# Patient Record
Sex: MALE | Race: WHITE | Employment: FULL TIME | URBAN - METROPOLITAN AREA
[De-identification: names, ages, dates, MRNs, and addresses within clinical notes are randomized per-mention and may not be internally consistent; named-entity substitution may affect disease eponyms.]

---

## 2019-10-05 LAB — Lab: NORMAL

## 2024-10-13 ENCOUNTER — TELEMEDICINE (OUTPATIENT)
Dept: NEUROLOGY | Age: 44
End: 2024-10-13

## 2024-10-13 DIAGNOSIS — I72.0 CAROTID PSEUDOANEURYSM (HCC): Primary | ICD-10-CM

## 2024-10-16 ENCOUNTER — TELEPHONE (OUTPATIENT)
Dept: NEUROLOGY | Age: 44
End: 2024-10-16

## 2024-10-16 NOTE — TELEPHONE ENCOUNTER
Pt called in and wanting to schedule an appointment with you. You spoke with the ED doctor. Your next opening is not until 03/26/2025 and you stated to have pt follow up in 2 to 4 months. Would you like for me to double book you or have pt keep this appt in March? I put the patient on the wait list also.

## 2024-10-29 ENCOUNTER — APPOINTMENT (OUTPATIENT)
Dept: GENERAL RADIOLOGY | Age: 44
End: 2024-10-29
Payer: COMMERCIAL

## 2024-10-29 ENCOUNTER — APPOINTMENT (OUTPATIENT)
Dept: CT IMAGING | Age: 44
End: 2024-10-29
Payer: COMMERCIAL

## 2024-10-29 ENCOUNTER — APPOINTMENT (OUTPATIENT)
Dept: MRI IMAGING | Age: 44
End: 2024-10-29
Payer: COMMERCIAL

## 2024-10-29 ENCOUNTER — HOSPITAL ENCOUNTER (EMERGENCY)
Age: 44
Discharge: HOME | End: 2024-10-29
Attending: EMERGENCY MEDICINE
Payer: COMMERCIAL

## 2024-10-29 VITALS
OXYGEN SATURATION: 97 % | TEMPERATURE: 98.3 F | HEART RATE: 76 BPM | DIASTOLIC BLOOD PRESSURE: 106 MMHG | RESPIRATION RATE: 17 BRPM | SYSTOLIC BLOOD PRESSURE: 142 MMHG

## 2024-10-29 DIAGNOSIS — R42 DIZZINESS: Primary | ICD-10-CM

## 2024-10-29 LAB
ANION GAP SERPL CALCULATED.3IONS-SCNC: 10 MMOL/L (ref 9–16)
BASOPHILS # BLD: 0.04 K/UL (ref 0–0.2)
BASOPHILS NFR BLD: 1 % (ref 0–2)
BUN SERPL-MCNC: 8 MG/DL (ref 6–20)
CALCIUM SERPL-MCNC: 9.5 MG/DL (ref 8.6–10.4)
CHLORIDE SERPL-SCNC: 99 MMOL/L (ref 98–107)
CO2 SERPL-SCNC: 26 MMOL/L (ref 20–31)
CREAT SERPL-MCNC: 0.9 MG/DL (ref 0.7–1.2)
EOSINOPHIL # BLD: 0.17 K/UL (ref 0–0.44)
EOSINOPHILS RELATIVE PERCENT: 3 % (ref 1–4)
ERYTHROCYTE [DISTWIDTH] IN BLOOD BY AUTOMATED COUNT: 11.8 % (ref 11.8–14.4)
GFR, ESTIMATED: >90 ML/MIN/1.73M2
GLUCOSE SERPL-MCNC: 96 MG/DL (ref 74–99)
HCT VFR BLD AUTO: 47.6 % (ref 40.7–50.3)
HGB BLD-MCNC: 16.2 G/DL (ref 13–17)
IMM GRANULOCYTES # BLD AUTO: <0.03 K/UL (ref 0–0.3)
IMM GRANULOCYTES NFR BLD: 0 %
LYMPHOCYTES NFR BLD: 2 K/UL (ref 1.1–3.7)
LYMPHOCYTES RELATIVE PERCENT: 34 % (ref 24–43)
MAGNESIUM SERPL-MCNC: 2.4 MG/DL (ref 1.6–2.6)
MCH RBC QN AUTO: 31.7 PG (ref 25.2–33.5)
MCHC RBC AUTO-ENTMCNC: 34 G/DL (ref 28.4–34.8)
MCV RBC AUTO: 93.2 FL (ref 82.6–102.9)
MONOCYTES NFR BLD: 0.58 K/UL (ref 0.1–1.2)
MONOCYTES NFR BLD: 10 % (ref 3–12)
NEUTROPHILS NFR BLD: 52 % (ref 36–65)
NEUTS SEG NFR BLD: 3.04 K/UL (ref 1.5–8.1)
NRBC BLD-RTO: 0 PER 100 WBC
PLATELET # BLD AUTO: 272 K/UL (ref 138–453)
PMV BLD AUTO: 10 FL (ref 8.1–13.5)
POTASSIUM SERPL-SCNC: 3.8 MMOL/L (ref 3.7–5.3)
RBC # BLD AUTO: 5.11 M/UL (ref 4.21–5.77)
SODIUM SERPL-SCNC: 135 MMOL/L (ref 136–145)
TROPONIN I SERPL HS-MCNC: <6 NG/L (ref 0–22)
WBC OTHER # BLD: 5.9 K/UL (ref 3.5–11.3)

## 2024-10-29 PROCEDURE — 71046 X-RAY EXAM CHEST 2 VIEWS: CPT

## 2024-10-29 PROCEDURE — 80048 BASIC METABOLIC PNL TOTAL CA: CPT

## 2024-10-29 PROCEDURE — 85025 COMPLETE CBC W/AUTO DIFF WBC: CPT

## 2024-10-29 PROCEDURE — 83735 ASSAY OF MAGNESIUM: CPT

## 2024-10-29 PROCEDURE — 93005 ELECTROCARDIOGRAM TRACING: CPT | Performed by: STUDENT IN AN ORGANIZED HEALTH CARE EDUCATION/TRAINING PROGRAM

## 2024-10-29 PROCEDURE — 70551 MRI BRAIN STEM W/O DYE: CPT

## 2024-10-29 PROCEDURE — 70498 CT ANGIOGRAPHY NECK: CPT

## 2024-10-29 PROCEDURE — 70450 CT HEAD/BRAIN W/O DYE: CPT

## 2024-10-29 PROCEDURE — 84484 ASSAY OF TROPONIN QUANT: CPT

## 2024-10-29 PROCEDURE — 6360000004 HC RX CONTRAST MEDICATION: Performed by: STUDENT IN AN ORGANIZED HEALTH CARE EDUCATION/TRAINING PROGRAM

## 2024-10-29 PROCEDURE — 99285 EMERGENCY DEPT VISIT HI MDM: CPT | Performed by: PSYCHIATRY & NEUROLOGY

## 2024-10-29 PROCEDURE — 99285 EMERGENCY DEPT VISIT HI MDM: CPT

## 2024-10-29 RX ORDER — IOPAMIDOL 755 MG/ML
75 INJECTION, SOLUTION INTRAVASCULAR
Status: COMPLETED | OUTPATIENT
Start: 2024-10-29 | End: 2024-10-29

## 2024-10-29 RX ADMIN — IOPAMIDOL 75 ML: 755 INJECTION, SOLUTION INTRAVENOUS at 15:50

## 2024-10-29 ASSESSMENT — ENCOUNTER SYMPTOMS: RESPIRATORY NEGATIVE: 1

## 2024-10-29 ASSESSMENT — PAIN - FUNCTIONAL ASSESSMENT: PAIN_FUNCTIONAL_ASSESSMENT: NONE - DENIES PAIN

## 2024-10-29 NOTE — ED NOTES
Pt came into the ed via triage due to having dizziness,   Pt has a hx of vertigo   Pt stated he has pain in the left arm   Pt is Aox4 and ambulatory   RR are equal and regular   Pt recently was at the hospital   Family at bedside   Pt stated his dizziness has been getting worse

## 2024-10-29 NOTE — CONSULTS
Endovascular Neurosurgery Consult    Pt Name: Jeremy Teran  MRN: [id number]  YOB: 1980  Date of evaluation: 10/29/2024  Primary Care Physician: Burak Malloy MD  Reason for evaluation: L ICA pseudoaneurysm     SUBJECTIVE:   History of Chief Complaint:    Jeremy Teran is a 44 y.o. 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  Patient states that this dizziness has been for 3 weeks and was evaluated at [facility] ER where CTA head and neck done did not show short segment fusiform dilatation of the right vertebral artery V4 segment measuring up to 4.5 mm as well as short segment irregularity at the mid to distal left cervical ICA possible short segment dissection.  At the time neuroendovascular has been consulted through telemedicine and recommended outpatient neurology consultation with daily aspirin.  Patient states that the dizziness gets better with meclizine but today it has been ongoing for 2 hours with intermittent relief.  There is no loss of consciousness, he describes the dizziness as a spinning sensation associated with blurring of vision during the attack but no diplopia, visual loss.  He denies tinnitus, hearing loss, upper respiratory tract infection symptoms, headaches, head injury, limb weakness but mentions that pain in his left arm along with tingling sensation of fourth and fifth fingers of left hand.  Patient states that he was told at [facility] that whenever his symptoms get worse to go to [facility] which brought him here today.     Neuroendovascular was consulted regarding incidental finding of left distal cervical ica pseudoaneurysm and underlying dissection     Allergies  is allergic to codeine and flagyl [metronidazole].  Medications  Prior to Admission medications    Not on File    Scheduled Meds:  Continuous Infusions:  PRN Meds:.  Past 
discussed with the patient, patient's family and the medical staff.     Patient is admitted as inpatient status because of co-morbidities listed above, severity of signs and symptoms as outlined, requirement for current medical therapies and most importantly because of direct risk to patient if care not provided in a hospital setting.    Narayan Echols MD  Neurology Resident PGY-2  10/29/2024  4:21 PM    Copy sent to Burak Lima MD

## 2024-10-30 LAB
EKG ATRIAL RATE: 77 BPM
EKG P AXIS: 49 DEGREES
EKG P-R INTERVAL: 138 MS
EKG Q-T INTERVAL: 364 MS
EKG QRS DURATION: 90 MS
EKG QTC CALCULATION (BAZETT): 411 MS
EKG R AXIS: 44 DEGREES
EKG T AXIS: 2 DEGREES
EKG VENTRICULAR RATE: 77 BPM

## 2024-10-30 PROCEDURE — 93010 ELECTROCARDIOGRAM REPORT: CPT | Performed by: INTERNAL MEDICINE

## 2024-10-30 NOTE — DISCHARGE INSTRUCTIONS
You were seen due to dizziness.  Our neuroendovascular team did evaluate you and they recommend that she follow-up with them as an outpatient.  Please continue your daily aspirin and take meclizine as needed for dizziness.  Please see the speech therapist above for vestibular rehab.    Please return to the ED if you notice any sudden onset headache, nausea, vomiting, fever, chills, confusion, loss of sensation, sudden neck pain or for any other concern.

## 2024-10-30 NOTE — ED PROVIDER NOTES
[facility] ED     Emergency Department     Faculty Attestation        I performed a history and physical examination of the patient and discussed management with the resident. I reviewed the resident’s note and agree with the documented findings and plan of care. Any areas of disagreement are noted on the chart. I was personally present for the key portions of any procedures. I have documented in the chart those procedures where I was not present during the key portions. I have reviewed the emergency nurses triage note. I agree with the chief complaint, past medical history, past surgical history, allergies, medications, social and family history as documented unless otherwise noted below.    For mid-level providers such as nurse practitioners as well as physicians assistants:    I have personally seen and evaluated the patient.    I find the patient's history and physical exam are consistent with NP/PA documentation.  I agree with the care provided, treatment rendered, disposition, & follow-up plan.     Additional findings are as noted.    Vital Signs: BP (!) 141/97   Pulse 89   Temp 98.3 °F (36.8 °C) (Oral)   Resp 19   SpO2 100%   PCP:  Burak Malloy MD    Pertinent Comments:     Patient with vertigo.  He has had the symptoms persistently for 2 weeks.  He had CTA at outlying facility which showed a small aneurysm with small dissection flap and he was put on aspirin Plavix he has continued to be symptomatic he now has new numbness to his left upper extremities and present for the past 3 to 4 days will repeat CT head neck, stroke consultation        Roly Oleary MD    Attending Emergency Medicine Physician            Julio Oleary MD  10/29/24 [id number]    
     [facility] ED  Emergency Department  Emergency Medicine Resident Sign-out     Care of Jeremy Teran was assumed from Dr. Yusuf and is being seen for Dizziness  .  The patient's initial evaluation and plan have been discussed with the prior provider who initially evaluated the patient.     EMERGENCY DEPARTMENT COURSE / MEDICAL DECISION MAKING:       MEDICATIONS GIVEN:  No orders of the defined types were placed in this encounter.      LABS / RADIOLOGY:     Labs Reviewed   BASIC METABOLIC PANEL - Abnormal; Notable for the following components:       Result Value    Sodium 135 (*)     All other components within normal limits   CBC WITH AUTO DIFFERENTIAL   MAGNESIUM   PREVIOUS SPECIMEN   TROPONIN   POC BLOOD GAS AND CHEMISTRY       XR CHEST (2 VW)    Result Date: 10/29/2024  EXAMINATION: TWO XRAY VIEWS OF THE CHEST 10/29/2024 2:02 pm COMPARISON: None. HISTORY: ORDERING SYSTEM PROVIDED HISTORY: dizziness TECHNOLOGIST PROVIDED HISTORY: dizziness FINDINGS: The lungs are without acute focal process.  There is no effusion or pneumothorax. The cardiomediastinal silhouette is without acute process. The osseous structures are without acute process.     No acute process.       RECENT VITALS:     Temp: 98.3 °F (36.8 °C),  Pulse: 89, Respirations: 16, BP: 130/89, SpO2: 96 %      This patient is a 44 y.o. Male with previously healthy male. Dizziness spells with vision changes, spontaneous. Went to [facility] and obtained CT head, has a L IC pseudoanerysm, right vertebral artery abnormality. Started on daily ASA and outpatient follow up. Has spells for hours, Left arm paresthesias. Took meclizine which helped. Cardiac eval with some T wave inversions but no STEMI. Trop less than 6. Labs unremarkable. CXR negative. CTA head and neck and CT head without. Neuro and Neuroendovascular consulted. Aniscicoria to right pupil.      ED Course as of 10/29/24 2347   Tue Oct 29, 2024   1730 CTA HEAD NECK W 
Faculty Sign-Out Attestation  Handoff taken on the following patient from prior Attending Physician: hue  Note Started: 9:14 PM EDT    I was available and discussed any additional care issues that arose and coordinated the management plans with the resident(s) caring for the patient during my duty period. Any areas of disagreement with resident’s documentation of care or procedures are noted on the chart. I was personally present for the key portions of any/all procedures during my duty period. I have documented in the chart those procedures where I was not present during the key portions.    Patient was signed out by the previous physician pending neurology and neuroendovascular evaluation, MRI, CTA of the head and neck.  MRI was negative.  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  The patient's symptoms are improved.  Neurology and neuroendovascular recommend outpatient follow-up with continued oral aspirin.  The patient was instructed to return back to emergency department immediately for any change in neurologic symptoms or sudden onset headache.      Abelardo Mcdonnell DO  Attending Physician       Abelardo Mcdonnell DO  10/29/24 8271    
PM  Labs reviewed.  Troponin less than 6.  CBC BMP magnesium unremarkable    CTA and CT head pending.    Neurology and neuroendovascular have been paged pending their assessment.    This patient's care will be handed over to an oncoming resident.           PROCEDURES:       CONSULTS:  IP CONSULT TO ENDOVASCULAR NEUROSURGERY  IP CONSULT TO NEUROLOGY    CRITICAL CARE:  There was significant risk of life threatening deterioration of patient's condition requiring my direct management. Critical care time   minutes, excluding any documented procedures.    FINAL IMPRESSION      1. Dizziness          DISPOSITION / PLAN     DISPOSITION             PATIENT REFERRED TO:  No follow-up provider specified.    DISCHARGE MEDICATIONS:  New Prescriptions    No medications on file       Vinicio Yusuf MD  Emergency Medicine Resident    (Please note that portions of this note were completed with a voice recognition program.  Efforts were made to edit the dictations but occasionally words are mis-transcribed.)

## 2024-10-31 RX ORDER — ASPIRIN 81 MG/1
81 TABLET ORAL DAILY
Qty: 90 TABLET | Refills: 5 | Status: SHIPPED | OUTPATIENT
Start: 2024-10-31

## 2024-10-31 RX ORDER — CLOPIDOGREL BISULFATE 75 MG/1
75 TABLET ORAL DAILY
Qty: 30 TABLET | Refills: 5 | Status: SHIPPED | OUTPATIENT
Start: 2024-10-31

## 2024-11-01 RX ORDER — FLUTICASONE PROPIONATE 50 MCG
2 SPRAY, SUSPENSION (ML) NASAL DAILY
COMMUNITY
Start: 2024-10-23

## 2024-11-01 RX ORDER — MECLIZINE HYDROCHLORIDE 25 MG/1
25 TABLET ORAL 3 TIMES DAILY PRN
COMMUNITY
Start: 2024-10-09

## 2024-11-04 ENCOUNTER — ANESTHESIA EVENT (OUTPATIENT)
Dept: INTERVENTIONAL RADIOLOGY/VASCULAR | Age: 44
DRG: 039 | End: 2024-11-04
Payer: COMMERCIAL

## 2024-11-04 ENCOUNTER — ANESTHESIA (OUTPATIENT)
Dept: INTERVENTIONAL RADIOLOGY/VASCULAR | Age: 44
DRG: 039 | End: 2024-11-04
Payer: COMMERCIAL

## 2024-11-04 ENCOUNTER — HOSPITAL ENCOUNTER (INPATIENT)
Dept: INTERVENTIONAL RADIOLOGY/VASCULAR | Age: 44
LOS: 1 days | Discharge: HOME | DRG: 039 | End: 2024-11-05
Attending: STUDENT IN AN ORGANIZED HEALTH CARE EDUCATION/TRAINING PROGRAM | Admitting: STUDENT IN AN ORGANIZED HEALTH CARE EDUCATION/TRAINING PROGRAM
Payer: COMMERCIAL

## 2024-11-04 DIAGNOSIS — I72.0 CAROTID PSEUDOANEURYSM (HCC): ICD-10-CM

## 2024-11-04 LAB
ABO + RH BLD: NORMAL
ACT BLD: 123 SEC (ref 79–149)
ACT BLD: 228 SEC (ref 79–149)
ACT BLD: 262 SEC (ref 79–149)
ARM BAND NUMBER: NORMAL
BLOOD BANK SAMPLE EXPIRATION: NORMAL
BLOOD GROUP ANTIBODIES SERPL: NEGATIVE
BUN BLD-MCNC: 9 MG/DL (ref 8–26)
CA-I BLD-SCNC: 1.26 MMOL/L (ref 1.15–1.33)
CHLORIDE BLD-SCNC: 103 MMOL/L (ref 98–107)
CLOSURE TME COLL+ADP BLD: 119 SEC (ref 67–112)
CO2 BLD CALC-SCNC: 32 MMOL/L (ref 22–30)
COLLAGEN EPINEPHRINE TIME: >300 SEC (ref 85–172)
EGFR, POC: >90 ML/MIN/1.73M2
GLUCOSE BLD-MCNC: 100 MG/DL (ref 74–100)
HCO3 VENOUS: 31 MMOL/L (ref 22–29)
HCT VFR BLD AUTO: 50 % (ref 41–53)
O2 SAT, VEN: 59.9 % (ref 60–85)
PCO2 VENOUS: 57.1 MM HG (ref 41–51)
PH VENOUS: 7.34 (ref 7.32–7.43)
PLATELET FUNCTION INTERP: ABNORMAL
PO2 VENOUS: 33.8 MM HG (ref 30–50)
POC ANION GAP: 6 MMOL/L (ref 7–16)
POC CREATININE: 0.9 MG/DL (ref 0.51–1.19)
POC HEMOGLOBIN (CALC): 17 G/DL (ref 13.5–17.5)
POC LACTIC ACID: 1.1 MMOL/L (ref 0.56–1.39)
POSITIVE BASE EXCESS, VEN: 3.3 MMOL/L (ref 0–3)
POTASSIUM BLD-SCNC: 4.1 MMOL/L (ref 3.5–4.5)
POTASSIUM BLD-SCNC: 6.4 MMOL/L (ref 3.5–5.1)
SODIUM BLD-SCNC: 140 MMOL/L (ref 138–146)

## 2024-11-04 PROCEDURE — 84132 ASSAY OF SERUM POTASSIUM: CPT

## 2024-11-04 PROCEDURE — 2580000003 HC RX 258: Performed by: ANESTHESIOLOGY

## 2024-11-04 PROCEDURE — 3700000000 HC ANESTHESIA ATTENDED CARE

## 2024-11-04 PROCEDURE — 6360000002 HC RX W HCPCS: Performed by: PSYCHIATRY & NEUROLOGY

## 2024-11-04 PROCEDURE — 82803 BLOOD GASES ANY COMBINATION: CPT

## 2024-11-04 PROCEDURE — 6360000002 HC RX W HCPCS

## 2024-11-04 PROCEDURE — 61626 TCAT PERM OCCLS/EMBOL NONCNS: CPT

## 2024-11-04 PROCEDURE — 36224 PLACE CATH CAROTD ART: CPT

## 2024-11-04 PROCEDURE — 85014 HEMATOCRIT: CPT

## 2024-11-04 PROCEDURE — 75894 X-RAYS TRANSCATH THERAPY: CPT

## 2024-11-04 PROCEDURE — 94761 N-INVAS EAR/PLS OXIMETRY MLT: CPT

## 2024-11-04 PROCEDURE — 75898 FOLLOW-UP ANGIOGRAPHY: CPT

## 2024-11-04 PROCEDURE — 86850 RBC ANTIBODY SCREEN: CPT

## 2024-11-04 PROCEDURE — 83605 ASSAY OF LACTIC ACID: CPT

## 2024-11-04 PROCEDURE — 80051 ELECTROLYTE PANEL: CPT

## 2024-11-04 PROCEDURE — 6360000004 HC RX CONTRAST MEDICATION: Performed by: STUDENT IN AN ORGANIZED HEALTH CARE EDUCATION/TRAINING PROGRAM

## 2024-11-04 PROCEDURE — 6370000000 HC RX 637 (ALT 250 FOR IP): Performed by: PSYCHIATRY & NEUROLOGY

## 2024-11-04 PROCEDURE — 84520 ASSAY OF UREA NITROGEN: CPT

## 2024-11-04 PROCEDURE — 2580000003 HC RX 258: Performed by: PSYCHIATRY & NEUROLOGY

## 2024-11-04 PROCEDURE — 86901 BLOOD TYPING SEROLOGIC RH(D): CPT

## 2024-11-04 PROCEDURE — 2580000003 HC RX 258: Performed by: NURSE PRACTITIONER

## 2024-11-04 PROCEDURE — C1876 STENT, NON-COA/NON-COV W/DEL: HCPCS

## 2024-11-04 PROCEDURE — 03VL3HZ RESTRICTION OF LEFT INTERNAL CAROTID ARTERY WITH INTRALUMINAL DEVICE, FLOW DIVERTER, PERCUTANEOUS APPROACH: ICD-10-PCS | Performed by: PSYCHIATRY & NEUROLOGY

## 2024-11-04 PROCEDURE — 3700000001 HC ADD 15 MINUTES (ANESTHESIA)

## 2024-11-04 PROCEDURE — 2000000000 HC ICU R&B

## 2024-11-04 PROCEDURE — 85347 COAGULATION TIME ACTIVATED: CPT

## 2024-11-04 PROCEDURE — 82947 ASSAY GLUCOSE BLOOD QUANT: CPT

## 2024-11-04 PROCEDURE — 85576 BLOOD PLATELET AGGREGATION: CPT

## 2024-11-04 PROCEDURE — 82565 ASSAY OF CREATININE: CPT

## 2024-11-04 PROCEDURE — 86900 BLOOD TYPING SEROLOGIC ABO: CPT

## 2024-11-04 PROCEDURE — 82330 ASSAY OF CALCIUM: CPT

## 2024-11-04 PROCEDURE — 6360000002 HC RX W HCPCS: Performed by: NURSE PRACTITIONER

## 2024-11-04 PROCEDURE — C1894 INTRO/SHEATH, NON-LASER: HCPCS

## 2024-11-04 RX ORDER — ONDANSETRON 4 MG/1
4 TABLET, ORALLY DISINTEGRATING ORAL EVERY 8 HOURS PRN
Status: DISCONTINUED | OUTPATIENT
Start: 2024-11-04 | End: 2024-11-05 | Stop reason: HOSPADM

## 2024-11-04 RX ORDER — SODIUM CHLORIDE, SODIUM LACTATE, POTASSIUM CHLORIDE, CALCIUM CHLORIDE 600; 310; 30; 20 MG/100ML; MG/100ML; MG/100ML; MG/100ML
INJECTION, SOLUTION INTRAVENOUS CONTINUOUS
Status: DISCONTINUED | OUTPATIENT
Start: 2024-11-04 | End: 2024-11-04

## 2024-11-04 RX ORDER — PROCHLORPERAZINE EDISYLATE 5 MG/ML
10 INJECTION INTRAMUSCULAR; INTRAVENOUS ONCE
Status: COMPLETED | OUTPATIENT
Start: 2024-11-04 | End: 2024-11-04

## 2024-11-04 RX ORDER — LABETALOL HYDROCHLORIDE 5 MG/ML
10 INJECTION, SOLUTION INTRAVENOUS
Status: DISCONTINUED | OUTPATIENT
Start: 2024-11-04 | End: 2024-11-05 | Stop reason: HOSPADM

## 2024-11-04 RX ORDER — ONDANSETRON 2 MG/ML
INJECTION INTRAMUSCULAR; INTRAVENOUS
Status: DISCONTINUED | OUTPATIENT
Start: 2024-11-04 | End: 2024-11-04 | Stop reason: SDUPTHER

## 2024-11-04 RX ORDER — DIPHENHYDRAMINE HYDROCHLORIDE 50 MG/ML
25 INJECTION INTRAMUSCULAR; INTRAVENOUS ONCE
Status: COMPLETED | OUTPATIENT
Start: 2024-11-04 | End: 2024-11-04

## 2024-11-04 RX ORDER — MAGNESIUM SULFATE IN WATER 40 MG/ML
2000 INJECTION, SOLUTION INTRAVENOUS ONCE
Status: COMPLETED | OUTPATIENT
Start: 2024-11-04 | End: 2024-11-04

## 2024-11-04 RX ORDER — KETOROLAC TROMETHAMINE 15 MG/ML
15 INJECTION, SOLUTION INTRAMUSCULAR; INTRAVENOUS ONCE
Status: COMPLETED | OUTPATIENT
Start: 2024-11-04 | End: 2024-11-04

## 2024-11-04 RX ORDER — MAGNESIUM SULFATE IN WATER 40 MG/ML
2000 INJECTION, SOLUTION INTRAVENOUS PRN
Status: DISCONTINUED | OUTPATIENT
Start: 2024-11-04 | End: 2024-11-05 | Stop reason: HOSPADM

## 2024-11-04 RX ORDER — DIPHENHYDRAMINE HYDROCHLORIDE 50 MG/ML
12.5 INJECTION INTRAMUSCULAR; INTRAVENOUS
Status: DISCONTINUED | OUTPATIENT
Start: 2024-11-04 | End: 2024-11-04

## 2024-11-04 RX ORDER — CLOPIDOGREL BISULFATE 75 MG/1
75 TABLET ORAL DAILY
Status: DISCONTINUED | OUTPATIENT
Start: 2024-11-05 | End: 2024-11-05 | Stop reason: HOSPADM

## 2024-11-04 RX ORDER — SODIUM CHLORIDE 0.9 % (FLUSH) 0.9 %
5-40 SYRINGE (ML) INJECTION PRN
Status: DISCONTINUED | OUTPATIENT
Start: 2024-11-04 | End: 2024-11-05 | Stop reason: HOSPADM

## 2024-11-04 RX ORDER — HEPARIN SODIUM 1000 [USP'U]/ML
INJECTION, SOLUTION INTRAVENOUS; SUBCUTANEOUS
Status: DISCONTINUED | OUTPATIENT
Start: 2024-11-04 | End: 2024-11-04 | Stop reason: SDUPTHER

## 2024-11-04 RX ORDER — SODIUM CHLORIDE 0.9 % (FLUSH) 0.9 %
5-40 SYRINGE (ML) INJECTION EVERY 12 HOURS SCHEDULED
Status: DISCONTINUED | OUTPATIENT
Start: 2024-11-04 | End: 2024-11-05 | Stop reason: HOSPADM

## 2024-11-04 RX ORDER — POLYETHYLENE GLYCOL 3350 17 G/17G
17 POWDER, FOR SOLUTION ORAL DAILY PRN
Status: DISCONTINUED | OUTPATIENT
Start: 2024-11-04 | End: 2024-11-05 | Stop reason: HOSPADM

## 2024-11-04 RX ORDER — NALOXONE HYDROCHLORIDE 0.4 MG/ML
INJECTION, SOLUTION INTRAMUSCULAR; INTRAVENOUS; SUBCUTANEOUS PRN
Status: DISCONTINUED | OUTPATIENT
Start: 2024-11-04 | End: 2024-11-04

## 2024-11-04 RX ORDER — DROPERIDOL 2.5 MG/ML
0.62 INJECTION, SOLUTION INTRAMUSCULAR; INTRAVENOUS
Status: DISCONTINUED | OUTPATIENT
Start: 2024-11-04 | End: 2024-11-04

## 2024-11-04 RX ORDER — OXYCODONE HYDROCHLORIDE 5 MG/1
10 TABLET ORAL PRN
Status: DISCONTINUED | OUTPATIENT
Start: 2024-11-04 | End: 2024-11-04

## 2024-11-04 RX ORDER — LORAZEPAM 2 MG/ML
0.5 INJECTION INTRAMUSCULAR
Status: DISCONTINUED | OUTPATIENT
Start: 2024-11-04 | End: 2024-11-04

## 2024-11-04 RX ORDER — PANTOPRAZOLE SODIUM 40 MG/1
40 TABLET, DELAYED RELEASE ORAL
Status: DISCONTINUED | OUTPATIENT
Start: 2024-11-05 | End: 2024-11-05 | Stop reason: HOSPADM

## 2024-11-04 RX ORDER — IODIXANOL 270 MG/ML
100 INJECTION, SOLUTION INTRAVASCULAR
Status: COMPLETED | OUTPATIENT
Start: 2024-11-04 | End: 2024-11-04

## 2024-11-04 RX ORDER — POTASSIUM CHLORIDE 1500 MG/1
40 TABLET, EXTENDED RELEASE ORAL PRN
Status: DISCONTINUED | OUTPATIENT
Start: 2024-11-04 | End: 2024-11-05 | Stop reason: HOSPADM

## 2024-11-04 RX ORDER — MIDAZOLAM HYDROCHLORIDE 1 MG/ML
INJECTION, SOLUTION INTRAMUSCULAR; INTRAVENOUS
Status: DISCONTINUED | OUTPATIENT
Start: 2024-11-04 | End: 2024-11-04 | Stop reason: SDUPTHER

## 2024-11-04 RX ORDER — FLUTICASONE PROPIONATE 50 MCG
2 SPRAY, SUSPENSION (ML) NASAL DAILY PRN
Status: DISCONTINUED | OUTPATIENT
Start: 2024-11-05 | End: 2024-11-05 | Stop reason: HOSPADM

## 2024-11-04 RX ORDER — POTASSIUM CHLORIDE 7.45 MG/ML
10 INJECTION INTRAVENOUS PRN
Status: DISCONTINUED | OUTPATIENT
Start: 2024-11-04 | End: 2024-11-05 | Stop reason: HOSPADM

## 2024-11-04 RX ORDER — MECLIZINE HCL 12.5 MG 12.5 MG/1
25 TABLET ORAL 3 TIMES DAILY PRN
Status: DISCONTINUED | OUTPATIENT
Start: 2024-11-04 | End: 2024-11-05 | Stop reason: HOSPADM

## 2024-11-04 RX ORDER — HYDRALAZINE HYDROCHLORIDE 20 MG/ML
10 INJECTION INTRAMUSCULAR; INTRAVENOUS
Status: DISCONTINUED | OUTPATIENT
Start: 2024-11-04 | End: 2024-11-04

## 2024-11-04 RX ORDER — HYDRALAZINE HYDROCHLORIDE 20 MG/ML
10 INJECTION INTRAMUSCULAR; INTRAVENOUS
Status: DISCONTINUED | OUTPATIENT
Start: 2024-11-04 | End: 2024-11-05 | Stop reason: HOSPADM

## 2024-11-04 RX ORDER — FENTANYL CITRATE 50 UG/ML
25 INJECTION, SOLUTION INTRAMUSCULAR; INTRAVENOUS EVERY 5 MIN PRN
Status: DISCONTINUED | OUTPATIENT
Start: 2024-11-04 | End: 2024-11-04

## 2024-11-04 RX ORDER — 0.9 % SODIUM CHLORIDE 0.9 %
250 INTRAVENOUS SOLUTION INTRAVENOUS ONCE
Status: COMPLETED | OUTPATIENT
Start: 2024-11-04 | End: 2024-11-04

## 2024-11-04 RX ORDER — ONDANSETRON 2 MG/ML
4 INJECTION INTRAMUSCULAR; INTRAVENOUS EVERY 6 HOURS PRN
Status: DISCONTINUED | OUTPATIENT
Start: 2024-11-04 | End: 2024-11-05 | Stop reason: HOSPADM

## 2024-11-04 RX ORDER — LABETALOL HYDROCHLORIDE 5 MG/ML
10 INJECTION, SOLUTION INTRAVENOUS
Status: DISCONTINUED | OUTPATIENT
Start: 2024-11-04 | End: 2024-11-04

## 2024-11-04 RX ORDER — SODIUM CHLORIDE 9 MG/ML
INJECTION, SOLUTION INTRAVENOUS PRN
Status: DISCONTINUED | OUTPATIENT
Start: 2024-11-04 | End: 2024-11-05 | Stop reason: HOSPADM

## 2024-11-04 RX ORDER — ASPIRIN 81 MG/1
81 TABLET ORAL DAILY
Status: DISCONTINUED | OUTPATIENT
Start: 2024-11-05 | End: 2024-11-05 | Stop reason: HOSPADM

## 2024-11-04 RX ORDER — PROCHLORPERAZINE EDISYLATE 5 MG/ML
5 INJECTION INTRAMUSCULAR; INTRAVENOUS
Status: DISCONTINUED | OUTPATIENT
Start: 2024-11-04 | End: 2024-11-04

## 2024-11-04 RX ORDER — ACETAMINOPHEN 325 MG/1
650 TABLET ORAL EVERY 6 HOURS PRN
Status: DISCONTINUED | OUTPATIENT
Start: 2024-11-04 | End: 2024-11-05 | Stop reason: HOSPADM

## 2024-11-04 RX ORDER — FENTANYL CITRATE 50 UG/ML
INJECTION, SOLUTION INTRAMUSCULAR; INTRAVENOUS
Status: DISCONTINUED | OUTPATIENT
Start: 2024-11-04 | End: 2024-11-04 | Stop reason: SDUPTHER

## 2024-11-04 RX ORDER — ACETAMINOPHEN 650 MG/1
650 SUPPOSITORY RECTAL EVERY 6 HOURS PRN
Status: DISCONTINUED | OUTPATIENT
Start: 2024-11-04 | End: 2024-11-05 | Stop reason: HOSPADM

## 2024-11-04 RX ORDER — PROTAMINE SULFATE 10 MG/ML
INJECTION, SOLUTION INTRAVENOUS
Status: DISCONTINUED | OUTPATIENT
Start: 2024-11-04 | End: 2024-11-04 | Stop reason: SDUPTHER

## 2024-11-04 RX ORDER — OXYCODONE HYDROCHLORIDE 5 MG/1
5 TABLET ORAL PRN
Status: DISCONTINUED | OUTPATIENT
Start: 2024-11-04 | End: 2024-11-04

## 2024-11-04 RX ORDER — NICARDIPINE HYDROCHLORIDE 0.1 MG/ML
2.5-15 INJECTION INTRAVENOUS CONTINUOUS
Status: DISCONTINUED | OUTPATIENT
Start: 2024-11-04 | End: 2024-11-05

## 2024-11-04 RX ADMIN — ACETAMINOPHEN 650 MG: 325 TABLET ORAL at 12:28

## 2024-11-04 RX ADMIN — ONDANSETRON 4 MG: 2 INJECTION INTRAMUSCULAR; INTRAVENOUS at 15:38

## 2024-11-04 RX ADMIN — PROTAMINE SULFATE 40 MG: 10 INJECTION, SOLUTION INTRAVENOUS at 10:20

## 2024-11-04 RX ADMIN — PROCHLORPERAZINE EDISYLATE 10 MG: 5 INJECTION INTRAMUSCULAR; INTRAVENOUS at 18:05

## 2024-11-04 RX ADMIN — NICARDIPINE HYDROCHLORIDE 5 MG/HR: 0.1 INJECTION INTRAVENOUS at 15:47

## 2024-11-04 RX ADMIN — SODIUM CHLORIDE, PRESERVATIVE FREE 10 ML: 5 INJECTION INTRAVENOUS at 11:21

## 2024-11-04 RX ADMIN — HEPARIN SODIUM 5200 UNITS: 1000 INJECTION INTRAVENOUS; SUBCUTANEOUS at 09:04

## 2024-11-04 RX ADMIN — FENTANYL CITRATE 25 MCG: 50 INJECTION, SOLUTION INTRAMUSCULAR; INTRAVENOUS at 10:18

## 2024-11-04 RX ADMIN — HYDRALAZINE HYDROCHLORIDE 10 MG: 20 INJECTION INTRAMUSCULAR; INTRAVENOUS at 14:57

## 2024-11-04 RX ADMIN — LABETALOL HYDROCHLORIDE 10 MG: 5 INJECTION, SOLUTION INTRAVENOUS at 13:30

## 2024-11-04 RX ADMIN — Medication 2 G: at 08:45

## 2024-11-04 RX ADMIN — FENTANYL CITRATE 50 MCG: 50 INJECTION, SOLUTION INTRAMUSCULAR; INTRAVENOUS at 10:23

## 2024-11-04 RX ADMIN — FENTANYL CITRATE 50 MCG: 50 INJECTION, SOLUTION INTRAMUSCULAR; INTRAVENOUS at 08:54

## 2024-11-04 RX ADMIN — KETOROLAC TROMETHAMINE 15 MG: 15 INJECTION, SOLUTION INTRAMUSCULAR; INTRAVENOUS at 18:06

## 2024-11-04 RX ADMIN — FENTANYL CITRATE 50 MCG: 50 INJECTION, SOLUTION INTRAMUSCULAR; INTRAVENOUS at 08:19

## 2024-11-04 RX ADMIN — ONDANSETRON 4 MG: 2 INJECTION INTRAMUSCULAR; INTRAVENOUS at 10:39

## 2024-11-04 RX ADMIN — DIPHENHYDRAMINE HYDROCHLORIDE 25 MG: 50 INJECTION INTRAMUSCULAR; INTRAVENOUS at 18:06

## 2024-11-04 RX ADMIN — MAGNESIUM SULFATE HEPTAHYDRATE 2000 MG: 40 INJECTION, SOLUTION INTRAVENOUS at 16:24

## 2024-11-04 RX ADMIN — FENTANYL CITRATE 50 MCG: 50 INJECTION, SOLUTION INTRAMUSCULAR; INTRAVENOUS at 10:37

## 2024-11-04 RX ADMIN — MIDAZOLAM 2 MG: 1 INJECTION INTRAMUSCULAR; INTRAVENOUS at 08:16

## 2024-11-04 RX ADMIN — IODIXANOL 82 ML: 270 INJECTION, SOLUTION INTRAVASCULAR at 10:35

## 2024-11-04 RX ADMIN — NICARDIPINE HYDROCHLORIDE 5 MG/HR: 0.1 INJECTION INTRAVENOUS at 18:30

## 2024-11-04 RX ADMIN — FENTANYL CITRATE 50 MCG: 50 INJECTION, SOLUTION INTRAMUSCULAR; INTRAVENOUS at 09:25

## 2024-11-04 RX ADMIN — HEPARIN SODIUM 2000 UNITS: 1000 INJECTION INTRAVENOUS; SUBCUTANEOUS at 09:31

## 2024-11-04 RX ADMIN — FENTANYL CITRATE 25 MCG: 50 INJECTION, SOLUTION INTRAMUSCULAR; INTRAVENOUS at 10:00

## 2024-11-04 RX ADMIN — SODIUM CHLORIDE 250 ML: 9 INJECTION, SOLUTION INTRAVENOUS at 15:44

## 2024-11-04 RX ADMIN — SODIUM CHLORIDE, POTASSIUM CHLORIDE, SODIUM LACTATE AND CALCIUM CHLORIDE: 600; 310; 30; 20 INJECTION, SOLUTION INTRAVENOUS at 06:53

## 2024-11-04 ASSESSMENT — PAIN SCALES - GENERAL
PAINLEVEL_OUTOF10: 4
PAINLEVEL_OUTOF10: 0
PAINLEVEL_OUTOF10: 2
PAINLEVEL_OUTOF10: 0

## 2024-11-04 ASSESSMENT — PAIN DESCRIPTION - LOCATION: LOCATION: HEAD

## 2024-11-04 ASSESSMENT — PAIN - FUNCTIONAL ASSESSMENT: PAIN_FUNCTIONAL_ASSESSMENT: NONE - DENIES PAIN

## 2024-11-04 NOTE — ANESTHESIA PROCEDURE NOTES
Arterial Line:    An arterial line was placed using surface landmarks, in the OR for the following indication(s): .    A 20 gauge (size), 1 and 3/4 inch (length), Arrow (type) catheter was placed, Seldinger technique used, into the right radial artery, secured by Tegaderm.  Anesthesia type: Local  Local infiltration: Injection    Events:  patient tolerated procedure well with no complications and EBL < 5mL.11/4/2024 8:20 AM11/4/2024 8:34 AM  Anesthesiologist: Filippo Sanchez MD  Resident/CRNA: Swapnil Esteves APRN - CRNA  Other anesthesia staff: Tashia Khan RN  Performed: Resident/CRNA   Preanesthetic Checklist  Completed: patient identified, IV checked, site marked, risks and benefits discussed, surgical/procedural consents, equipment checked, pre-op evaluation, timeout performed, anesthesia consent given, oxygen available, monitors applied/VS acknowledged, fire risk safety assessment completed and verbalized and blood product R/B/A discussed and consented

## 2024-11-04 NOTE — SIGNIFICANT EVENT
Nursing reports patient developed headache which has worsened despite as needed Tylenol and IV magnesium and fluid bolus.  Patient evaluated at bedside.  Remains alert and oriented x 3, following commands.  NIHSS 0.  No focal deficits on exam.  Right groin site covered with small dry dressing, clean dry and intact.  Reports he has a headache behind his eyes and in the back of his head and in his neck.  Rates his headache a 7/10.  Reports associated nausea.  States this is somewhat similar to his typical migraine.  He takes Maxalt as needed for headache at home which is nonformulary here.  I will give a dose of Toradol, Benadryl and Compazine.  Discussed with neuroendovascular team.    JAE Zambrano - CNP  Neuro Critical Care  11/04/24 4:55 PM

## 2024-11-04 NOTE — H&P
2-6 weeks, and with Dr. Foss in 3-4 months.    Rush Thakur MD, Pager [phone]  Electronically signed 11/04/24 at 7:30 AM  Stroke, Neurocritical Care & Neurointervention  [facility] Stroke Network  [facility] Stroke [place]     
maintenance IVF, tolerating PO   - Replace electrolytes PRN  - Daily BMP    GI/NUTRITION:  NUTRITION:  ADULT DIET; Regular  - Bowel regimen: GlycoLax.  - GI prophylaxis: Protonix sub for home PPI    ID:  No known infection  - Afebrile  - Monitor off antibiotics  - Daily CBC    HEME:   - Monitor for signs and symptoms of bleeding postprocedure  - Daily CBC    ENDOCRINE:  - Continue to monitor blood glucose, goal <180    OTHER:  - PT/OT/ST    PROPHYLAXIS:  Stress ulcer: PPI    DVT PROPHYLAXIS:  - SCD sleeves - Thigh High   - No chemoprophylaxis anticoagulation at this time, consider starting 11/5 if unable to discharge home.    DISPOSITION: Admit to the neuro ICU for close neurological monitoring post treatment of left ICA pseudoaneurysm      JAE Zambrano - CNP  Neuro Critical Care Service   11/4/2024     1:37 PM

## 2024-11-04 NOTE — ANESTHESIA PRE PROCEDURE
Department of Anesthesiology  Preprocedure Note       Name:  Jeremy Teran   Age:  44 y.o.  :  1980                                          MRN:  [id number]         Date:  2024      Surgeon: * No surgeons listed *    Procedure: * No procedures listed *    Medications prior to admission:   Prior to Admission medications    Medication Sig Start Date End Date Taking? Authorizing Provider   omeprazole (PRILOSEC) 20 MG delayed release capsule Take 1 capsule by mouth daily 10/23/24  Yes Rosalind Herron MD   fluticasone (FLONASE) 50 MCG/ACT nasal spray 2 sprays by Nasal route daily 10/23/24  Yes Rosalind Herron MD   meclizine (ANTIVERT) 25 MG tablet Take 1 tablet by mouth 3 times daily as needed 10/9/24  Yes Rosalind Herron MD   Loratadine (CLARITIN PO) Take by mouth   Yes Rosalind Herron MD   aspirin 81 MG EC tablet Take 1 tablet by mouth daily 10/31/24  Yes Rush Thakur MD   clopidogrel (PLAVIX) 75 MG tablet Take 1 tablet by mouth daily 10/31/24  Yes Rush Thakur MD       Current medications:    Current Outpatient Medications   Medication Sig Dispense Refill   • omeprazole (PRILOSEC) 20 MG delayed release capsule Take 1 capsule by mouth daily     • fluticasone (FLONASE) 50 MCG/ACT nasal spray 2 sprays by Nasal route daily     • meclizine (ANTIVERT) 25 MG tablet Take 1 tablet by mouth 3 times daily as needed     • Loratadine (CLARITIN PO) Take by mouth     • aspirin 81 MG EC tablet Take 1 tablet by mouth daily 90 tablet 5   • clopidogrel (PLAVIX) 75 MG tablet Take 1 tablet by mouth daily 30 tablet 5     No current facility-administered medications for this encounter.       Allergies:    Allergies   Allergen Reactions   • Codeine Nausea And Vomiting   • Flagyl [Metronidazole] Rash       Problem List:    Patient Active Problem List   Diagnosis Code   • Carotid pseudoaneurysm (HCC) I72.0   • Dizziness R42       Past Medical History:        Diagnosis Date   • Hypertension    • Under

## 2024-11-04 NOTE — BRIEF OP NOTE
III    POST-PROCEDURAL EXAM :   Stable neurological Exam  Neurological exam performed and unchanged from initial H&P or consult    Closure:  right Angioseal 8   F        POST-PROCEDURAL MONITORING : see orders  Disposition: Neuro ICU      Recommendations:  Back to Neuro ICU  Do not bend right leg for 3 hours.  Groin checks per protocol.  Peripheral pulse checks per protocol.  SBP goal 100-140  Continue with dual antiplatelet therapy  Follow up with Roque Marroquin MD  8 weeks after discharge and Dr. Foss 3-4 months after discharge.        MD Shivam Smith MD   Pager [phone]  Stroke, Neurocritical Care & Neurointervention  [facility] Stroke Network  [facility] Stroke [facility] [place]  Electronically signed 11/4/2024 at 10:28 AM

## 2024-11-04 NOTE — SEDATION DOCUMENTATION
Closure device deployed to the right femoral artery pressure initiated per Dr. Thakur, for 20 mins

## 2024-11-04 NOTE — ANESTHESIA POSTPROCEDURE EVALUATION
Department of Anesthesiology  Postprocedure Note    Patient: Jeremy Teran  MRN: [id number]  YOB: 1980  Date of evaluation: 11/4/2024    Procedure Summary       Date: 11/04/24 Room / Location: [facility]    Anesthesia Start: 0808 Anesthesia Stop: 1100    Procedure: IR ANGIOGRAM CAROTID CEREBRAL BILATERAL Diagnosis:       Carotid pseudoaneurysm (HCC)      Aneurysm of left carotid artery (HCC)      (EMBO/COILING)    Scheduled Providers:  Responsible Provider: Filippo Sanchez MD    Anesthesia Type: MAC ASA Status: 2            Anesthesia Type: MAC    Nanci Phase I: Nanci Score: 10    Nanci Phase II:      Anesthesia Post Evaluation    Patient location during evaluation: ICU  Patient participation: complete - patient participated  Level of consciousness: awake and alert  Airway patency: patent  Nausea & Vomiting: no nausea and no vomiting  Cardiovascular status: blood pressure returned to baseline  Respiratory status: acceptable  Hydration status: euvolemic  Pain management: adequate    No notable events documented.

## 2024-11-05 VITALS
DIASTOLIC BLOOD PRESSURE: 88 MMHG | BODY MASS INDEX: 24.44 KG/M2 | SYSTOLIC BLOOD PRESSURE: 129 MMHG | OXYGEN SATURATION: 96 % | RESPIRATION RATE: 17 BRPM | WEIGHT: 165 LBS | TEMPERATURE: 98.2 F | HEART RATE: 80 BPM | HEIGHT: 69 IN

## 2024-11-05 LAB
ANION GAP SERPL CALCULATED.3IONS-SCNC: 10 MMOL/L (ref 9–16)
BASOPHILS # BLD: 0.03 K/UL (ref 0–0.2)
BASOPHILS NFR BLD: 0 % (ref 0–2)
BUN SERPL-MCNC: 5 MG/DL (ref 6–20)
CALCIUM SERPL-MCNC: 8.8 MG/DL (ref 8.6–10.4)
CHLORIDE SERPL-SCNC: 106 MMOL/L (ref 98–107)
CO2 SERPL-SCNC: 22 MMOL/L (ref 20–31)
CREAT SERPL-MCNC: 0.8 MG/DL (ref 0.7–1.2)
EOSINOPHIL # BLD: 0.17 K/UL (ref 0–0.44)
EOSINOPHILS RELATIVE PERCENT: 3 % (ref 1–4)
ERYTHROCYTE [DISTWIDTH] IN BLOOD BY AUTOMATED COUNT: 12.1 % (ref 11.8–14.4)
GFR, ESTIMATED: >90 ML/MIN/1.73M2
GLUCOSE SERPL-MCNC: 102 MG/DL (ref 74–99)
HCT VFR BLD AUTO: 42.6 % (ref 40.7–50.3)
HGB BLD-MCNC: 14.3 G/DL (ref 13–17)
IMM GRANULOCYTES # BLD AUTO: 0.03 K/UL (ref 0–0.3)
IMM GRANULOCYTES NFR BLD: 0 %
LYMPHOCYTES NFR BLD: 1.97 K/UL (ref 1.1–3.7)
LYMPHOCYTES RELATIVE PERCENT: 29 % (ref 24–43)
MCH RBC QN AUTO: 31.5 PG (ref 25.2–33.5)
MCHC RBC AUTO-ENTMCNC: 33.6 G/DL (ref 28.4–34.8)
MCV RBC AUTO: 93.8 FL (ref 82.6–102.9)
MONOCYTES NFR BLD: 0.61 K/UL (ref 0.1–1.2)
MONOCYTES NFR BLD: 9 % (ref 3–12)
NEUTROPHILS NFR BLD: 59 % (ref 36–65)
NEUTS SEG NFR BLD: 4 K/UL (ref 1.5–8.1)
NRBC BLD-RTO: 0 PER 100 WBC
PLATELET # BLD AUTO: 228 K/UL (ref 138–453)
PMV BLD AUTO: 9.9 FL (ref 8.1–13.5)
POTASSIUM SERPL-SCNC: 3.8 MMOL/L (ref 3.7–5.3)
RBC # BLD AUTO: 4.54 M/UL (ref 4.21–5.77)
SODIUM SERPL-SCNC: 138 MMOL/L (ref 136–145)
WBC OTHER # BLD: 6.8 K/UL (ref 3.5–11.3)

## 2024-11-05 PROCEDURE — 36415 COLL VENOUS BLD VENIPUNCTURE: CPT

## 2024-11-05 PROCEDURE — 6370000000 HC RX 637 (ALT 250 FOR IP): Performed by: NURSE PRACTITIONER

## 2024-11-05 PROCEDURE — 80048 BASIC METABOLIC PNL TOTAL CA: CPT

## 2024-11-05 PROCEDURE — 2580000003 HC RX 258: Performed by: PSYCHIATRY & NEUROLOGY

## 2024-11-05 PROCEDURE — 99232 SBSQ HOSP IP/OBS MODERATE 35: CPT | Performed by: STUDENT IN AN ORGANIZED HEALTH CARE EDUCATION/TRAINING PROGRAM

## 2024-11-05 PROCEDURE — 6370000000 HC RX 637 (ALT 250 FOR IP): Performed by: PSYCHIATRY & NEUROLOGY

## 2024-11-05 PROCEDURE — 85025 COMPLETE CBC W/AUTO DIFF WBC: CPT

## 2024-11-05 RX ADMIN — PANTOPRAZOLE SODIUM 40 MG: 40 TABLET, DELAYED RELEASE ORAL at 08:39

## 2024-11-05 RX ADMIN — ACETAMINOPHEN 650 MG: 325 TABLET ORAL at 08:38

## 2024-11-05 RX ADMIN — CLOPIDOGREL BISULFATE 75 MG: 75 TABLET ORAL at 08:39

## 2024-11-05 RX ADMIN — SODIUM CHLORIDE, PRESERVATIVE FREE 5 ML: 5 INJECTION INTRAVENOUS at 08:40

## 2024-11-05 RX ADMIN — ACETAMINOPHEN 650 MG: 325 TABLET ORAL at 00:10

## 2024-11-05 RX ADMIN — ASPIRIN 81 MG: 81 TABLET, COATED ORAL at 08:39

## 2024-11-05 ASSESSMENT — PAIN DESCRIPTION - LOCATION
LOCATION: HEAD

## 2024-11-05 ASSESSMENT — PAIN SCALES - GENERAL
PAINLEVEL_OUTOF10: 4
PAINLEVEL_OUTOF10: 3

## 2024-11-05 ASSESSMENT — PAIN DESCRIPTION - DESCRIPTORS
DESCRIPTORS: ACHING
DESCRIPTORS: ACHING

## 2024-11-05 NOTE — PROGRESS NOTES
mm.          Saccular aneurysm of the distal cervical left ICA with maximum diameter of  6.7 mm    MRI Brain:  No acute intracranial abnormality    IMPRESSIONS:   44 y.o. male who presents with history of htn and incidental finding of left distal cervical ica pseudoaneurysm    Differential DDx:  Left cervical ica pseudoaneurysm    PLANS:     -140  Continue with baby aspirin   Continue with Plavix  Treated with flow diverter Surpass evolve   neurology evaluation for reported dizziness/syncope outpt      Discussed with Dr. Foss.    Please arrange follow-up with Dr. Schmid clinic in 2-6 weeks, and with Dr. Foss in 3-4 months.    Rush Thakur MD, Pager [phone]  Electronically signed 11/05/24 at 8:28 AM  Stroke, Neurocritical Care & Neurointervention  [facility] Stroke Network  [facility]

## 2024-11-05 NOTE — PLAN OF CARE
Problem: Pain  Goal: Verbalizes/displays adequate comfort level or baseline comfort level  11/4/2024 2050 by Mariam Crain, RN  Outcome: Progressing  Flowsheets (Taken 11/4/2024 2000)  Verbalizes/displays adequate comfort level or baseline comfort level: Encourage patient to monitor pain and request assistance  11/4/2024 1837 by Brandi Lee, RN  Outcome: Progressing

## 2024-11-05 NOTE — DISCHARGE INSTRUCTIONS
You were admitted to the hospital after elective treatment of a left ICA cervical segment pseudoaneurysm using surpass flow diverter (stent).  Continue taking your aspirin and Plavix as prescribed.  Do not stop taking your medications without speaking with your care team first.  Follow up in the Neuro Endovascular clinic in 6-8 weeks with Dr. Schmid and in 3 months with Dr. Foss.  Avoid lifting more than 10 pounds for 7 days post procedure.  Recommend that you stop smoking.       Call 911 anytime you think you may need emergency care. For example, call if:    You passed out (lost consciousness).     You have severe trouble breathing.     You have sudden chest pain and shortness of breath, or you cough up blood.     You have symptoms of a stroke. These may include:  Sudden numbness, tingling, weakness, or loss of movement in your face, arm, or leg, especially on only one side of your body.  Sudden vision changes.  Sudden trouble speaking.  Sudden confusion or trouble understanding simple statements.  Sudden problems with walking or balance.  A sudden, severe headache that is different from past headaches.   Call your doctor now or seek immediate medical care if:    You are bleeding from the area where the catheter was put in your artery.     You have a fast-growing, painful lump at the catheter site.     You have symptoms of infection, such as:  Increased pain, swelling, warmth, or redness.  Red streaks leading from the area.  Pus draining from the area.  A fever.     Your leg, arm, or hand is painful, looks blue, or feels cold, numb, or tingly.   Watch closely for any changes in your health, and be sure to contact your doctor if:    You are not getting better as expected.

## 2024-11-05 NOTE — DISCHARGE SUMMARY
Right CFA technique: A right common femoral artery angiogram was performed and demonstrated arterial catheterization proximal to the bifurcation. There was no evidence of dissection or occlusion within the right common femoral artery. 8 [place]awk Angio-Seal closure device was used to establish hemostasis at the right common femoral artery access site. No immediate complications were experienced. Patient was re-examined after the procedure with no change noted in their neurologic examination, with distal pulses present. The patient was subsequently discharged from the neurointerventional suite to the neurointensive care unit. Impression: --Left mid cervical ICA laterally pointing pseudoaneurysm with irregular margins measuring 10.28 mm length by 2.3 mm in height --The above treated with Surpass Evolve 5 x 30 mm flow diverter device followed by balloon angioplasty using hyperform occlusion balloon catheter 7 x 7 Dr. Marroquin dictated this invasive procedure. Dr Foss was present for all procedural and imaging components of this case. Examination was reviewed and reported findings confirmed and evaluated by Dr. Foss.       MRI LIMITED BRAIN    Result Date: 10/29/2024  EXAMINATION: MRI OF THE BRAIN WITHOUT CONTRAST  10/29/2024 4:27 pm TECHNIQUE: Multiplanar multisequence MRI of the brain was performed without the administration of intravenous contrast. COMPARISON: None. HISTORY: ORDERING SYSTEM PROVIDED HISTORY: Left ICA pseudoaneurysm TECHNOLOGIST PROVIDED HISTORY: Left ICA pseudoaneurysm Reason for Exam: Left ICA pseudoaneurysm FINDINGS: INTRACRANIAL STRUCTURES/VENTRICLES: There is no acute infarct. No mass effect or midline shift. No evidence of an acute intracranial hemorrhage.  The ventricles and sulci are normal in size and configuration.  The sellar/suprasellar regions appear unremarkable.  The normal signal voids within the major intracranial vessels appear maintained. ORBITS: The visualized portion of the orbits

## 2024-12-13 ENCOUNTER — OFFICE VISIT (OUTPATIENT)
Dept: NEUROLOGY | Age: 44
End: 2024-12-13
Payer: COMMERCIAL

## 2024-12-13 VITALS
HEIGHT: 69 IN | SYSTOLIC BLOOD PRESSURE: 123 MMHG | WEIGHT: 165 LBS | DIASTOLIC BLOOD PRESSURE: 89 MMHG | HEART RATE: 75 BPM | BODY MASS INDEX: 24.44 KG/M2

## 2024-12-13 DIAGNOSIS — I72.0 CAROTID PSEUDOANEURYSM (HCC): Primary | ICD-10-CM

## 2024-12-13 PROCEDURE — 99215 OFFICE O/P EST HI 40 MIN: CPT | Performed by: STUDENT IN AN ORGANIZED HEALTH CARE EDUCATION/TRAINING PROGRAM

## 2024-12-13 NOTE — PROGRESS NOTES
Endovascular Neurosurgery Clinic Note    Pt Name: Jeremy Teran  MRN: [id number]  YOB: 1980  Date of evaluation: 12/13/2024  Primary Care Physician: Jacky William MD  Reason for evaluation: L ICA pseudoaneurysm     SUBJECTIVE:     The patient has been doing well since the procedure and is tolerating dual antiplatelet therapy. He reported one episode of epistaxis but has no other concerns. We discussed follow-up imaging options, including CTA and DSA. The patient chose a diagnostic angiogram to evaluate both the carotid and vertebral artery aneurysms.        History of Chief Complaint:    Jeremy Teran is a 44 y.o. 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  Patient states that this dizziness has been for 3 weeks and was evaluated at [facility] where CTA head and neck done did not show short segment fusiform dilatation of the right vertebral artery V4 segment measuring up to 4.5 mm as well as short segment irregularity at the mid to distal left cervical ICA possible short segment dissection.  At the time neuroendovascular has been consulted through telemedicine and recommended outpatient neurology consultation with daily aspirin.  Patient states that the dizziness gets better with meclizine but today it has been ongoing for 2 hours with intermittent relief.  There is no loss of consciousness, he describes the dizziness as a spinning sensation associated with blurring of vision during the attack but no diplopia, visual loss.  He denies tinnitus, hearing loss, upper respiratory tract infection symptoms, headaches, head injury, limb weakness but mentions that pain in his left arm along with tingling sensation of fourth and fifth fingers of left hand.  Patient states that he was told at [facility] that whenever his symptoms get worse to go to [facility] which

## 2025-01-27 ENCOUNTER — TELEPHONE (OUTPATIENT)
Dept: NEUROLOGY | Age: 45
End: 2025-01-27

## 2025-01-27 NOTE — TELEPHONE ENCOUNTER
Pt DSA rescheduled from 9:00 am to 11:00 am on 02/04/25. I called patient, no answer left a message with details and  asked for a return call to confirm.

## 2025-01-27 NOTE — TELEPHONE ENCOUNTER
Called patient once more, he answered, confirmed time change with him. He vocalized understanding.Will arrive at [facility] at 9:00 am for an 11:00 am procedure.

## 2025-02-04 ENCOUNTER — HOSPITAL ENCOUNTER (OUTPATIENT)
Dept: INTERVENTIONAL RADIOLOGY/VASCULAR | Age: 45
Discharge: HOME | End: 2025-02-06
Payer: COMMERCIAL

## 2025-02-04 VITALS
RESPIRATION RATE: 16 BRPM | OXYGEN SATURATION: 96 % | WEIGHT: 166 LBS | SYSTOLIC BLOOD PRESSURE: 126 MMHG | TEMPERATURE: 98.5 F | DIASTOLIC BLOOD PRESSURE: 99 MMHG | HEIGHT: 70 IN | HEART RATE: 80 BPM | BODY MASS INDEX: 23.77 KG/M2

## 2025-02-04 DIAGNOSIS — I72.0 CAROTID PSEUDOANEURYSM (HCC): ICD-10-CM

## 2025-02-04 PROBLEM — I72.6 VERTEBRAL ARTERY PSEUDOANEURYSM (HCC): Status: ACTIVE | Noted: 2025-02-04

## 2025-02-04 LAB
BUN BLD-MCNC: 5 MG/DL (ref 8–26)
CHLORIDE BLD-SCNC: 100 MMOL/L (ref 98–107)
EGFR, POC: >90 ML/MIN/1.73M2
GLUCOSE BLD-MCNC: 98 MG/DL (ref 74–100)
HCT VFR BLD AUTO: 47 % (ref 41–53)
POC CREATININE: 0.9 MG/DL (ref 0.51–1.19)
POC HEMOGLOBIN (CALC): 16.1 G/DL (ref 13.5–17.5)
POTASSIUM BLD-SCNC: 4.7 MMOL/L (ref 3.5–4.5)
SODIUM BLD-SCNC: 141 MMOL/L (ref 138–146)

## 2025-02-04 PROCEDURE — 2709999900 IR ANGIOGRAM CAROTID CEREBRAL BILATERAL

## 2025-02-04 PROCEDURE — 36226 PLACE CATH VERTEBRAL ART: CPT

## 2025-02-04 PROCEDURE — 7100000011 HC PHASE II RECOVERY - ADDTL 15 MIN: Performed by: STUDENT IN AN ORGANIZED HEALTH CARE EDUCATION/TRAINING PROGRAM

## 2025-02-04 PROCEDURE — 82565 ASSAY OF CREATININE: CPT

## 2025-02-04 PROCEDURE — 82947 ASSAY GLUCOSE BLOOD QUANT: CPT

## 2025-02-04 PROCEDURE — 2580000003 HC RX 258: Performed by: STUDENT IN AN ORGANIZED HEALTH CARE EDUCATION/TRAINING PROGRAM

## 2025-02-04 PROCEDURE — 99152 MOD SED SAME PHYS/QHP 5/>YRS: CPT

## 2025-02-04 PROCEDURE — 84295 ASSAY OF SERUM SODIUM: CPT

## 2025-02-04 PROCEDURE — 82435 ASSAY OF BLOOD CHLORIDE: CPT

## 2025-02-04 PROCEDURE — 7100000010 HC PHASE II RECOVERY - FIRST 15 MIN: Performed by: STUDENT IN AN ORGANIZED HEALTH CARE EDUCATION/TRAINING PROGRAM

## 2025-02-04 PROCEDURE — 36224 PLACE CATH CAROTD ART: CPT

## 2025-02-04 PROCEDURE — 6370000000 HC RX 637 (ALT 250 FOR IP): Performed by: PSYCHIATRY & NEUROLOGY

## 2025-02-04 PROCEDURE — 75710 ARTERY X-RAYS ARM/LEG: CPT

## 2025-02-04 PROCEDURE — 84132 ASSAY OF SERUM POTASSIUM: CPT

## 2025-02-04 PROCEDURE — 76937 US GUIDE VASCULAR ACCESS: CPT

## 2025-02-04 PROCEDURE — 85014 HEMATOCRIT: CPT

## 2025-02-04 PROCEDURE — 84520 ASSAY OF UREA NITROGEN: CPT

## 2025-02-04 PROCEDURE — 99153 MOD SED SAME PHYS/QHP EA: CPT

## 2025-02-04 PROCEDURE — 6360000002 HC RX W HCPCS: Performed by: PSYCHIATRY & NEUROLOGY

## 2025-02-04 PROCEDURE — 6360000004 HC RX CONTRAST MEDICATION: Performed by: STUDENT IN AN ORGANIZED HEALTH CARE EDUCATION/TRAINING PROGRAM

## 2025-02-04 RX ORDER — 0.9 % SODIUM CHLORIDE 0.9 %
INTRAVENOUS SOLUTION INTRAVENOUS CONTINUOUS PRN
Status: COMPLETED | OUTPATIENT
Start: 2025-02-04 | End: 2025-02-04

## 2025-02-04 RX ORDER — FENTANYL CITRATE 50 UG/ML
INJECTION, SOLUTION INTRAMUSCULAR; INTRAVENOUS PRN
Status: COMPLETED | OUTPATIENT
Start: 2025-02-04 | End: 2025-02-04

## 2025-02-04 RX ORDER — SODIUM CHLORIDE 9 MG/ML
INJECTION, SOLUTION INTRAVENOUS CONTINUOUS
Status: DISCONTINUED | OUTPATIENT
Start: 2025-02-04 | End: 2025-02-07 | Stop reason: HOSPADM

## 2025-02-04 RX ORDER — ONDANSETRON 2 MG/ML
4 INJECTION INTRAMUSCULAR; INTRAVENOUS EVERY 6 HOURS PRN
Status: DISCONTINUED | OUTPATIENT
Start: 2025-02-04 | End: 2025-02-07 | Stop reason: HOSPADM

## 2025-02-04 RX ORDER — MIDAZOLAM HYDROCHLORIDE 2 MG/2ML
INJECTION, SOLUTION INTRAMUSCULAR; INTRAVENOUS PRN
Status: COMPLETED | OUTPATIENT
Start: 2025-02-04 | End: 2025-02-04

## 2025-02-04 RX ORDER — SODIUM CHLORIDE 9 MG/ML
INJECTION, SOLUTION INTRAVENOUS PRN
Status: DISCONTINUED | OUTPATIENT
Start: 2025-02-04 | End: 2025-02-07 | Stop reason: HOSPADM

## 2025-02-04 RX ORDER — ONDANSETRON 4 MG/1
4 TABLET, ORALLY DISINTEGRATING ORAL EVERY 8 HOURS PRN
Status: DISCONTINUED | OUTPATIENT
Start: 2025-02-04 | End: 2025-02-07 | Stop reason: HOSPADM

## 2025-02-04 RX ORDER — SODIUM CHLORIDE 0.9 % (FLUSH) 0.9 %
5-40 SYRINGE (ML) INJECTION PRN
Status: DISCONTINUED | OUTPATIENT
Start: 2025-02-04 | End: 2025-02-07 | Stop reason: HOSPADM

## 2025-02-04 RX ORDER — SODIUM CHLORIDE 0.9 % (FLUSH) 0.9 %
5-40 SYRINGE (ML) INJECTION EVERY 12 HOURS SCHEDULED
Status: DISCONTINUED | OUTPATIENT
Start: 2025-02-04 | End: 2025-02-07 | Stop reason: HOSPADM

## 2025-02-04 RX ORDER — IODIXANOL 270 MG/ML
200 INJECTION, SOLUTION INTRAVASCULAR
Status: COMPLETED | OUTPATIENT
Start: 2025-02-04 | End: 2025-02-04

## 2025-02-04 RX ORDER — ACETAMINOPHEN 325 MG/1
650 TABLET ORAL ONCE
Status: COMPLETED | OUTPATIENT
Start: 2025-02-04 | End: 2025-02-04

## 2025-02-04 RX ADMIN — FENTANYL CITRATE 25 MCG: 50 INJECTION, SOLUTION INTRAMUSCULAR; INTRAVENOUS at 13:33

## 2025-02-04 RX ADMIN — IODIXANOL 86 ML: 270 INJECTION, SOLUTION INTRAVASCULAR at 14:27

## 2025-02-04 RX ADMIN — SODIUM CHLORIDE 1000 ML: 9 INJECTION, SOLUTION INTRAVENOUS at 13:36

## 2025-02-04 RX ADMIN — MIDAZOLAM HYDROCHLORIDE 1 MG: 1 INJECTION, SOLUTION INTRAMUSCULAR; INTRAVENOUS at 13:17

## 2025-02-04 RX ADMIN — FENTANYL CITRATE 50 MCG: 50 INJECTION, SOLUTION INTRAMUSCULAR; INTRAVENOUS at 13:17

## 2025-02-04 RX ADMIN — ACETAMINOPHEN 650 MG: 325 TABLET ORAL at 15:53

## 2025-02-04 ASSESSMENT — PAIN SCALES - GENERAL
PAINLEVEL_OUTOF10: 2
PAINLEVEL_OUTOF10: 5

## 2025-02-04 ASSESSMENT — PAIN DESCRIPTION - FREQUENCY: FREQUENCY: INTERMITTENT

## 2025-02-04 ASSESSMENT — PAIN DESCRIPTION - LOCATION: LOCATION: HEAD

## 2025-02-04 ASSESSMENT — PAIN DESCRIPTION - PAIN TYPE: TYPE: ACUTE PAIN

## 2025-02-04 NOTE — H&P
Endovascular Neurosurgery H&P Note    Pt Name: Jeremy Teran  MRN: [id number]  YOB: 1980  Date of evaluation: 2/4/2025  Primary Care Physician: Jacky William MD  Reason for evaluation: L ICA pseudoaneurysm     SUBJECTIVE:     Presents for scheduled elective follow-up DSA. Has no complaints. Has not had any new neurological symptoms or focal neuro deficits in the interim. Continues on Aspirin and Plavix.        History of Chief Complaint:    Jeremy Teran is a 45 y.o. 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  Patient states that this dizziness has been for 3 weeks and was evaluated at [facility] ER where CTA head and neck done did not show short segment fusiform dilatation of the right vertebral artery V4 segment measuring up to 4.5 mm as well as short segment irregularity at the mid to distal left cervical ICA possible short segment dissection.  At the time neuroendovascular has been consulted through telemedicine and recommended outpatient neurology consultation with daily aspirin.  Patient states that the dizziness gets better with meclizine but today it has been ongoing for 2 hours with intermittent relief.  There is no loss of consciousness, he describes the dizziness as a spinning sensation associated with blurring of vision during the attack but no diplopia, visual loss.  He denies tinnitus, hearing loss, upper respiratory tract infection symptoms, headaches, head injury, limb weakness but mentions that pain in his left arm along with tingling sensation of fourth and fifth fingers of left hand.  Patient states that he was told at [facility] that whenever his symptoms get worse to go to [facility] which brought him here today.     Neuroendovascular was consulted regarding incidental finding of left distal cervical ica pseudoaneurysm and underlying

## 2025-02-04 NOTE — BRIEF OP NOTE
[facility]    NEUROENDOVASCULAR SERVICE: POST-OP NOTE: 2/4/2025    Pt Name: Jeremy Teran  MRN: [id number]  YOB: 1980  Date of Procedure: 2/4/2025  Primary Care Physician: Jacky William MD  Referring Physician:Dr. 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  Neurological exam performed and unchanged from initial H&P or consult  MODIFIED CHACHA SCORE: 0 - No symptoms at all.    Anesthesia: IV Moderate Sedation  An Immediate re-assessment was completed prior to sedation, and it is determined to be safe to proceed.  Complications: none    Intra-Operative EXAM:  Neurological exam performed and unchanged from initial H&P or consult    Patient arrived and wheeled in to the angio suite at: 1257  Monitoring and administration of sedation started at: 1257  Puncture obtained at: 1330  Vascular access was removed at: 1420  Manual pressure for minutes: TR Band  Sedation ended at: 1430  Patient wheeled out of the angio suite at: 1430    Heparin given: 2000 units  EBL: < Minimal      Cc            Specimens: Were not Obtained  Contrast:     Visipaque 270 low osmolar 86 Cc             Fluoro: 14.8 min    Findings:  Please see dictated Radiology note for further details  Right radial access w/ right vert and left CCA/ICA angiograms  Right vertebral artery pseudoaneurysm measuring 6.24mm width x 9.29mm length in A/P view and 5.71mm width and 7.88mm length in Lateral view.  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 There is minimal stent compaction in the mid-segment without flow limitation and no evidence of endoleak

## 2025-02-04 NOTE — PROGRESS NOTES
All discharge instructions reviewed, questions answered. Pulses obtained & documented. Pt ambulatory. Site clean dry and intact. No bleeding, hematoma, or bruising noted. Patient discharged with all belongings. Patient discharged via wheelchair .

## 2025-02-04 NOTE — OR NURSING
2.5mg Nicardipine  2000 units Heparin  2.5 mg Verapamil    Mixed together in sterile fashion for injection of radial artery access

## 2025-02-04 NOTE — PROGRESS NOTES
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 Shivam Foss    [place]:  Rush Thakur MD    Contrast: 86 cc of Visipaque-270.    Fluoroscopy time: 14.8 minutes    Access: Right radial artery.    Consent:   After explaining the risks and benefits to the patient and the patient's family, including but not limited to stroke, coma, death, vessel injury, dissection, tear, occlusion, and X-ray dye allergic type reaction, a signed consent form was obtained.     Indication and Clinical History:   Jeremy Teran is a 45 y.o. 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 Pt was referred for a follow-up diagnostic angiogram.      Anesthesia:   Local anesthesia with lidocaine. IV moderate sedation with Versed and Fentanyl. IV moderate sedation was supervised by the attending physician. The patient was independently monitored by a registered nurse assigned to the Department of Radiology using automated blood pressure, EKG and pulse oximetry. The detailed Conscious Record is permanently stored in the Hospital Information System.      Right Radial Artery Preparation:   The patient's wrist was prepped and draped in standard sterile fashion and

## 2025-02-04 NOTE — DISCHARGE INSTRUCTIONS
example, your doctor may ask you to eat healthy foods, quit smoking, lose extra weight, and be more active.  You will have to take medicines.  Your doctor may suggest a procedure to open narrowed or blocked arteries. This is called angioplasty. Or your doctor may suggest using healthy blood vessels to create detours around narrowed or blocked arteries. This is called bypass surgery.  Follow-up care is a key part of your treatment and safety. Be sure to make and go to all appointments, and call your doctor if you are having problems. It's also a good idea to know your test results and keep a list of the medicines you take.        SEDATION / ANALGESIA INFORMATION / HOME GOING ADVICE  Sedation/analgesia is used during short medical procedures under controlled supervision. The medication will produce a strong relaxation. You will be able to hear, speak and follow instructions, but your memory and alertness will be decreased.You will be able to swallow and breathe on your own. During sedation/analgesia your blood pressure, heart and breathing will be watched closely. After the procedure, you may not remember what was said or done.    POSSIBLE SIDE EFFECTS FROM MEDICATION  Drowsiness, dizziness, sleepiness, confusion, or delayed reaction times.  Difficulty with your balance especially while walking.  Difficulty focusing or blurred vision.    INSTRUCTIONS/RESTRICTIONS  Have someone responsible help you with your care.  Resume your medications unless otherwise instructed.  Resume normal diet or as tolerated.   Do not drive for 24 hours.  Do not operate equipment for 24 hours (lawnmowers, power tools, kitchen accessories, stove).  Do not drink any alcoholic beverages for a minimum of 24 hours.  Do not make important personal, legal or business decisions for 24 hours.  Drink extra amounts of fluids today.  If you received anesthesia and have not urinated within 8 hours after the procedure call your doctor.

## 2025-02-04 NOTE — PROGRESS NOTES
Patient returned to PCC room 2 post procedure.  Assessment & VS obtained. Restrictions/Education reviewed with patient. Post procedure pathway initiated. Pt without complications.  Right radial site with 10 mls of air in vasc band. Air to be removed per protocol.  No hematoma noted. Family at bedside. Pulses obtained and documented. Will continue to monitor.

## 2025-03-26 ENCOUNTER — OFFICE VISIT (OUTPATIENT)
Dept: NEUROLOGY | Age: 45
End: 2025-03-26
Payer: COMMERCIAL

## 2025-03-26 ENCOUNTER — TELEPHONE (OUTPATIENT)
Dept: NEUROLOGY | Age: 45
End: 2025-03-26

## 2025-03-26 VITALS
WEIGHT: 168.7 LBS | HEART RATE: 75 BPM | DIASTOLIC BLOOD PRESSURE: 97 MMHG | BODY MASS INDEX: 24.15 KG/M2 | HEIGHT: 70 IN | SYSTOLIC BLOOD PRESSURE: 131 MMHG

## 2025-03-26 DIAGNOSIS — I72.6 VERTEBRAL ARTERY PSEUDOANEURYSM: ICD-10-CM

## 2025-03-26 DIAGNOSIS — I72.0 CAROTID PSEUDOANEURYSM: Primary | ICD-10-CM

## 2025-03-26 PROCEDURE — 99215 OFFICE O/P EST HI 40 MIN: CPT | Performed by: PSYCHIATRY & NEUROLOGY

## 2025-03-26 NOTE — PROGRESS NOTES
Endovascular Neurosurgery - [place] [address]  [place] [address]  P: [phone]  F: [phone]      Dear Dr. William    HPI:     History of Present Illness  Jeremy Teran is a 45-year-old gentleman who presents for his first follow-up since his diagnostic cerebral angiogram, performed on 02/04/2025. This visit also marks the one-year follow-up from his original procedure conducted on 11/04/2024. With surpass FD of the right vert pseudoaneurysm and evaluation  of the right vert pseudoaneurysm    A satisfactory recovery post-procedure is reported, with no unusual symptoms such as bumps or bruises. The radial artery used for the procedure is healing well. He was advised by Dr. Schmid to seek a cardiology consultation, which has not yet been done. Dizziness has shown improvement.    INTERVAL: Since last visit, dizziness has improved.    SOCIAL HISTORY  Marital Status:       MEDICATIONS  CURRENT MEDS:  Aspirin  Plavix      Allergies   Allergen Reactions    Codeine Nausea And Vomiting    Flagyl [Metronidazole] Rash     Current Outpatient Medications   Medication Sig Dispense Refill    omeprazole (PRILOSEC) 20 MG delayed release capsule Take 1 capsule by mouth daily      fluticasone (FLONASE) 50 MCG/ACT nasal spray 2 sprays by Nasal route daily      Loratadine (CLARITIN PO) Take 1 tablet by mouth daily      aspirin 81 MG EC tablet Take 1 tablet by mouth daily 90 tablet 5    meclizine (ANTIVERT) 25 MG tablet Take 1 tablet by mouth 3 times daily as needed (Patient not taking: Reported on 3/26/2025)       No current facility-administered medications for this visit.     Past Medical History:   Diagnosis Date    Carotid pseudoaneurysm     Hypertension     Under care of team     Dr. Jacky William  [facility] last visit 10/2024      Past Surgical History:   Procedure Laterality Date    CEREBRAL ANGIOGRAM  02/04/2025    DR NIÑO    [facility]      EYE SURGERY

## 2025-04-04 RX ORDER — CLOPIDOGREL BISULFATE 75 MG/1
75 TABLET ORAL DAILY
Qty: 7 TABLET | Refills: 0 | Status: SHIPPED | OUTPATIENT
Start: 2025-04-04 | End: 2025-04-11

## 2025-04-04 RX ORDER — ASPIRIN 81 MG/1
81 TABLET ORAL DAILY
Qty: 7 TABLET | Refills: 0 | Status: SHIPPED | OUTPATIENT
Start: 2025-04-04 | End: 2025-04-11

## 2025-04-04 NOTE — TELEPHONE ENCOUNTER
Patient called back and scheduled for 04/30/25 12:30 PM. Arrival time 10:30 AM. NPO after midnight. He will need script for Plavix and Aspirin sent to pharmacy. Please send.  
Patient called, left voicemail re: scheduling wants to do May 5 or May 7. I called patient back and he did not answer, left message stating that we cannot do May 5 or May 7 as Dr. Foss is out both days.   
Patient will go home and discuss with his family and get back to me in regards to when he can schedule.  
,

## 2025-04-30 ENCOUNTER — ANESTHESIA EVENT (OUTPATIENT)
Dept: INTERVENTIONAL RADIOLOGY/VASCULAR | Age: 45
DRG: 252 | End: 2025-04-30
Payer: COMMERCIAL

## 2025-04-30 ENCOUNTER — ANESTHESIA (OUTPATIENT)
Dept: INTERVENTIONAL RADIOLOGY/VASCULAR | Age: 45
DRG: 252 | End: 2025-04-30
Payer: COMMERCIAL

## 2025-04-30 ENCOUNTER — HOSPITAL ENCOUNTER (INPATIENT)
Dept: INTERVENTIONAL RADIOLOGY/VASCULAR | Age: 45
LOS: 1 days | Discharge: HOME | DRG: 254 | End: 2025-05-01
Attending: PSYCHIATRY & NEUROLOGY | Admitting: PSYCHIATRY & NEUROLOGY
Payer: COMMERCIAL

## 2025-04-30 DIAGNOSIS — I72.6 ANEURYSM OF RIGHT VERTEBRAL ARTERY: ICD-10-CM

## 2025-04-30 PROBLEM — I65.01: Status: ACTIVE | Noted: 2025-04-30

## 2025-04-30 LAB
ABO + RH BLD: NORMAL
ACT BLD: 137 SEC (ref 79–149)
ARM BAND NUMBER: NORMAL
BLOOD BANK SAMPLE EXPIRATION: NORMAL
BLOOD GROUP ANTIBODIES SERPL: NEGATIVE
BUN BLD-MCNC: 6 MG/DL (ref 8–26)
CHLORIDE BLD-SCNC: 103 MMOL/L (ref 98–107)
CLOSURE TME COLL+ADP BLD: 127 SEC (ref 67–112)
COLLAGEN EPINEPHRINE TIME: 136 SEC (ref 85–172)
EGFR, POC: >90 ML/MIN/1.73M2
GLUCOSE BLD-MCNC: 91 MG/DL (ref 74–100)
HCT VFR BLD AUTO: 47 % (ref 41–53)
PLATELET FUNCTION INTERP: ABNORMAL
POC CREATININE: 0.9 MG/DL (ref 0.51–1.19)
POC HEMOGLOBIN (CALC): 16.1 G/DL (ref 13.5–17.5)
POTASSIUM BLD-SCNC: 4.1 MMOL/L (ref 3.5–4.5)
POTASSIUM BLD-SCNC: 6.6 MMOL/L (ref 3.5–4.5)
SODIUM BLD-SCNC: 138 MMOL/L (ref 138–146)

## 2025-04-30 PROCEDURE — 2580000003 HC RX 258

## 2025-04-30 PROCEDURE — 36226 PLACE CATH VERTEBRAL ART: CPT

## 2025-04-30 PROCEDURE — 2500000003 HC RX 250 WO HCPCS

## 2025-04-30 PROCEDURE — C1769 GUIDE WIRE: HCPCS

## 2025-04-30 PROCEDURE — 6360000004 HC RX CONTRAST MEDICATION

## 2025-04-30 PROCEDURE — 76937 US GUIDE VASCULAR ACCESS: CPT | Performed by: PSYCHIATRY & NEUROLOGY

## 2025-04-30 PROCEDURE — 75898 FOLLOW-UP ANGIOGRAPHY: CPT | Performed by: PSYCHIATRY & NEUROLOGY

## 2025-04-30 PROCEDURE — 99223 1ST HOSP IP/OBS HIGH 75: CPT | Performed by: PSYCHIATRY & NEUROLOGY

## 2025-04-30 PROCEDURE — 85576 BLOOD PLATELET AGGREGATION: CPT

## 2025-04-30 PROCEDURE — 86850 RBC ANTIBODY SCREEN: CPT

## 2025-04-30 PROCEDURE — 84520 ASSAY OF UREA NITROGEN: CPT

## 2025-04-30 PROCEDURE — 75894 X-RAYS TRANSCATH THERAPY: CPT

## 2025-04-30 PROCEDURE — 2000000000 HC ICU R&B

## 2025-04-30 PROCEDURE — 03LP3ZZ OCCLUSION OF RIGHT VERTEBRAL ARTERY, PERCUTANEOUS APPROACH: ICD-10-PCS | Performed by: PSYCHIATRY & NEUROLOGY

## 2025-04-30 PROCEDURE — 75894 X-RAYS TRANSCATH THERAPY: CPT | Performed by: PSYCHIATRY & NEUROLOGY

## 2025-04-30 PROCEDURE — 7100000001 HC PACU RECOVERY - ADDTL 15 MIN

## 2025-04-30 PROCEDURE — 7100000000 HC PACU RECOVERY - FIRST 15 MIN

## 2025-04-30 PROCEDURE — 85014 HEMATOCRIT: CPT

## 2025-04-30 PROCEDURE — 84295 ASSAY OF SERUM SODIUM: CPT

## 2025-04-30 PROCEDURE — 2500000003 HC RX 250 WO HCPCS: Performed by: ANESTHESIOLOGY

## 2025-04-30 PROCEDURE — 6370000000 HC RX 637 (ALT 250 FOR IP)

## 2025-04-30 PROCEDURE — 86901 BLOOD TYPING SEROLOGIC RH(D): CPT

## 2025-04-30 PROCEDURE — 3700000000 HC ANESTHESIA ATTENDED CARE

## 2025-04-30 PROCEDURE — 6360000002 HC RX W HCPCS

## 2025-04-30 PROCEDURE — 82947 ASSAY GLUCOSE BLOOD QUANT: CPT

## 2025-04-30 PROCEDURE — 75710 ARTERY X-RAYS ARM/LEG: CPT

## 2025-04-30 PROCEDURE — 61624 TCAT PERM OCCLS/EMBOLJ CNS: CPT | Performed by: PSYCHIATRY & NEUROLOGY

## 2025-04-30 PROCEDURE — 82435 ASSAY OF BLOOD CHLORIDE: CPT

## 2025-04-30 PROCEDURE — 75898 FOLLOW-UP ANGIOGRAPHY: CPT

## 2025-04-30 PROCEDURE — 82565 ASSAY OF CREATININE: CPT

## 2025-04-30 PROCEDURE — 61624 TCAT PERM OCCLS/EMBOLJ CNS: CPT

## 2025-04-30 PROCEDURE — 03VP3HZ RESTRICTION OF RIGHT VERTEBRAL ARTERY WITH INTRALUMINAL DEVICE, FLOW DIVERTER, PERCUTANEOUS APPROACH: ICD-10-PCS | Performed by: PSYCHIATRY & NEUROLOGY

## 2025-04-30 PROCEDURE — 84132 ASSAY OF SERUM POTASSIUM: CPT

## 2025-04-30 PROCEDURE — 3700000001 HC ADD 15 MINUTES (ANESTHESIA)

## 2025-04-30 PROCEDURE — 85347 COAGULATION TIME ACTIVATED: CPT

## 2025-04-30 PROCEDURE — 86900 BLOOD TYPING SEROLOGIC ABO: CPT

## 2025-04-30 PROCEDURE — 36217 PLACE CATHETER IN ARTERY: CPT | Performed by: PSYCHIATRY & NEUROLOGY

## 2025-04-30 RX ORDER — HYDRALAZINE HYDROCHLORIDE 20 MG/ML
10 INJECTION INTRAMUSCULAR; INTRAVENOUS
Status: DISCONTINUED | OUTPATIENT
Start: 2025-04-30 | End: 2025-05-01 | Stop reason: HOSPADM

## 2025-04-30 RX ORDER — FENTANYL CITRATE 50 UG/ML
INJECTION, SOLUTION INTRAMUSCULAR; INTRAVENOUS
Status: DISCONTINUED | OUTPATIENT
Start: 2025-04-30 | End: 2025-04-30 | Stop reason: SDUPTHER

## 2025-04-30 RX ORDER — PANTOPRAZOLE SODIUM 40 MG/1
40 TABLET, DELAYED RELEASE ORAL
Status: DISCONTINUED | OUTPATIENT
Start: 2025-05-01 | End: 2025-05-01 | Stop reason: HOSPADM

## 2025-04-30 RX ORDER — NALOXONE HYDROCHLORIDE 0.4 MG/ML
INJECTION, SOLUTION INTRAMUSCULAR; INTRAVENOUS; SUBCUTANEOUS PRN
Status: DISCONTINUED | OUTPATIENT
Start: 2025-04-30 | End: 2025-04-30

## 2025-04-30 RX ORDER — SODIUM CHLORIDE 9 MG/ML
INJECTION, SOLUTION INTRAVENOUS
Status: DISCONTINUED | OUTPATIENT
Start: 2025-04-30 | End: 2025-04-30 | Stop reason: SDUPTHER

## 2025-04-30 RX ORDER — SODIUM CHLORIDE 0.9 % (FLUSH) 0.9 %
5-40 SYRINGE (ML) INJECTION EVERY 12 HOURS SCHEDULED
Status: DISCONTINUED | OUTPATIENT
Start: 2025-04-30 | End: 2025-05-01 | Stop reason: HOSPADM

## 2025-04-30 RX ORDER — LABETALOL HYDROCHLORIDE 5 MG/ML
10 INJECTION, SOLUTION INTRAVENOUS
Status: DISCONTINUED | OUTPATIENT
Start: 2025-04-30 | End: 2025-05-01 | Stop reason: HOSPADM

## 2025-04-30 RX ORDER — ACETAMINOPHEN 325 MG/1
650 TABLET ORAL EVERY 4 HOURS PRN
Status: DISCONTINUED | OUTPATIENT
Start: 2025-04-30 | End: 2025-05-01 | Stop reason: HOSPADM

## 2025-04-30 RX ORDER — ONDANSETRON 2 MG/ML
4 INJECTION INTRAMUSCULAR; INTRAVENOUS
Status: DISCONTINUED | OUTPATIENT
Start: 2025-04-30 | End: 2025-04-30

## 2025-04-30 RX ORDER — SODIUM CHLORIDE 9 MG/ML
INJECTION, SOLUTION INTRAVENOUS PRN
Status: DISCONTINUED | OUTPATIENT
Start: 2025-04-30 | End: 2025-05-01 | Stop reason: HOSPADM

## 2025-04-30 RX ORDER — LIDOCAINE HYDROCHLORIDE 10 MG/ML
INJECTION, SOLUTION EPIDURAL; INFILTRATION; INTRACAUDAL; PERINEURAL
Status: DISCONTINUED | OUTPATIENT
Start: 2025-04-30 | End: 2025-04-30 | Stop reason: SDUPTHER

## 2025-04-30 RX ORDER — ONDANSETRON 2 MG/ML
4 INJECTION INTRAMUSCULAR; INTRAVENOUS EVERY 6 HOURS PRN
Status: DISCONTINUED | OUTPATIENT
Start: 2025-04-30 | End: 2025-05-01 | Stop reason: HOSPADM

## 2025-04-30 RX ORDER — DIPHENHYDRAMINE HYDROCHLORIDE 50 MG/ML
12.5 INJECTION, SOLUTION INTRAMUSCULAR; INTRAVENOUS
Status: DISCONTINUED | OUTPATIENT
Start: 2025-04-30 | End: 2025-04-30

## 2025-04-30 RX ORDER — FENTANYL CITRATE 50 UG/ML
25 INJECTION, SOLUTION INTRAMUSCULAR; INTRAVENOUS EVERY 5 MIN PRN
Status: DISCONTINUED | OUTPATIENT
Start: 2025-04-30 | End: 2025-04-30

## 2025-04-30 RX ORDER — SODIUM CHLORIDE 0.9 % (FLUSH) 0.9 %
5-40 SYRINGE (ML) INJECTION PRN
Status: DISCONTINUED | OUTPATIENT
Start: 2025-04-30 | End: 2025-05-01 | Stop reason: HOSPADM

## 2025-04-30 RX ORDER — FENTANYL CITRATE 50 UG/ML
50 INJECTION, SOLUTION INTRAMUSCULAR; INTRAVENOUS EVERY 5 MIN PRN
Status: DISCONTINUED | OUTPATIENT
Start: 2025-04-30 | End: 2025-04-30

## 2025-04-30 RX ORDER — SODIUM CHLORIDE, SODIUM LACTATE, POTASSIUM CHLORIDE, CALCIUM CHLORIDE 600; 310; 30; 20 MG/100ML; MG/100ML; MG/100ML; MG/100ML
INJECTION, SOLUTION INTRAVENOUS
Status: DISCONTINUED | OUTPATIENT
Start: 2025-04-30 | End: 2025-04-30 | Stop reason: SDUPTHER

## 2025-04-30 RX ORDER — CLOPIDOGREL BISULFATE 75 MG/1
75 TABLET ORAL DAILY
Status: DISCONTINUED | OUTPATIENT
Start: 2025-05-01 | End: 2025-05-01 | Stop reason: HOSPADM

## 2025-04-30 RX ORDER — PROPOFOL 10 MG/ML
INJECTION, EMULSION INTRAVENOUS
Status: DISCONTINUED | OUTPATIENT
Start: 2025-04-30 | End: 2025-04-30 | Stop reason: SDUPTHER

## 2025-04-30 RX ORDER — ASPIRIN 81 MG/1
81 TABLET ORAL DAILY
Status: DISCONTINUED | OUTPATIENT
Start: 2025-05-01 | End: 2025-05-01 | Stop reason: HOSPADM

## 2025-04-30 RX ORDER — LABETALOL HYDROCHLORIDE 5 MG/ML
INJECTION, SOLUTION INTRAVENOUS
Status: DISCONTINUED | OUTPATIENT
Start: 2025-04-30 | End: 2025-04-30 | Stop reason: SDUPTHER

## 2025-04-30 RX ORDER — MIDAZOLAM HYDROCHLORIDE 1 MG/ML
INJECTION, SOLUTION INTRAMUSCULAR; INTRAVENOUS
Status: DISCONTINUED | OUTPATIENT
Start: 2025-04-30 | End: 2025-04-30 | Stop reason: SDUPTHER

## 2025-04-30 RX ORDER — IODIXANOL 270 MG/ML
100 INJECTION, SOLUTION INTRAVASCULAR
Status: COMPLETED | OUTPATIENT
Start: 2025-04-30 | End: 2025-04-30

## 2025-04-30 RX ORDER — ROCURONIUM BROMIDE 10 MG/ML
INJECTION, SOLUTION INTRAVENOUS
Status: DISCONTINUED | OUTPATIENT
Start: 2025-04-30 | End: 2025-04-30 | Stop reason: SDUPTHER

## 2025-04-30 RX ADMIN — Medication 5 MG: at 14:52

## 2025-04-30 RX ADMIN — ROCURONIUM BROMIDE 50 MG: 10 INJECTION, SOLUTION INTRAVENOUS at 13:25

## 2025-04-30 RX ADMIN — Medication 5 MG: at 15:17

## 2025-04-30 RX ADMIN — HYDRALAZINE HYDROCHLORIDE 10 MG: 20 INJECTION INTRAMUSCULAR; INTRAVENOUS at 18:02

## 2025-04-30 RX ADMIN — SODIUM CHLORIDE, POTASSIUM CHLORIDE, SODIUM LACTATE AND CALCIUM CHLORIDE: 600; 310; 30; 20 INJECTION, SOLUTION INTRAVENOUS at 13:19

## 2025-04-30 RX ADMIN — FENTANYL CITRATE 25 MCG: 50 INJECTION, SOLUTION INTRAMUSCULAR; INTRAVENOUS at 13:56

## 2025-04-30 RX ADMIN — FENTANYL CITRATE 50 MCG: 50 INJECTION, SOLUTION INTRAMUSCULAR; INTRAVENOUS at 13:25

## 2025-04-30 RX ADMIN — ACETAMINOPHEN 650 MG: 325 TABLET ORAL at 21:06

## 2025-04-30 RX ADMIN — SUGAMMADEX 200 MG: 100 INJECTION, SOLUTION INTRAVENOUS at 14:54

## 2025-04-30 RX ADMIN — LIDOCAINE HYDROCHLORIDE 50 MG: 10 INJECTION, SOLUTION EPIDURAL; INFILTRATION; INTRACAUDAL; PERINEURAL at 13:25

## 2025-04-30 RX ADMIN — ROCURONIUM BROMIDE 10 MG: 10 INJECTION, SOLUTION INTRAVENOUS at 13:55

## 2025-04-30 RX ADMIN — Medication 5 MG: at 15:21

## 2025-04-30 RX ADMIN — SODIUM CHLORIDE, PRESERVATIVE FREE 10 ML: 5 INJECTION INTRAVENOUS at 22:02

## 2025-04-30 RX ADMIN — FENTANYL CITRATE 25 MCG: 50 INJECTION, SOLUTION INTRAMUSCULAR; INTRAVENOUS at 13:53

## 2025-04-30 RX ADMIN — SODIUM CHLORIDE, POTASSIUM CHLORIDE, SODIUM LACTATE AND CALCIUM CHLORIDE: 600; 310; 30; 20 INJECTION, SOLUTION INTRAVENOUS at 13:34

## 2025-04-30 RX ADMIN — Medication 10 MG: at 17:06

## 2025-04-30 RX ADMIN — SODIUM CHLORIDE: 9 INJECTION, SOLUTION INTRAVENOUS at 13:39

## 2025-04-30 RX ADMIN — SODIUM CHLORIDE, POTASSIUM CHLORIDE, SODIUM LACTATE AND CALCIUM CHLORIDE: 600; 310; 30; 20 INJECTION, SOLUTION INTRAVENOUS at 14:10

## 2025-04-30 RX ADMIN — IODIXANOL 48 ML: 270 INJECTION, SOLUTION INTRAVASCULAR at 14:52

## 2025-04-30 RX ADMIN — Medication 5 MG: at 14:59

## 2025-04-30 RX ADMIN — MIDAZOLAM 2 MG: 1 INJECTION INTRAMUSCULAR; INTRAVENOUS at 13:21

## 2025-04-30 RX ADMIN — PROPOFOL 150 MG: 10 INJECTION, EMULSION INTRAVENOUS at 13:25

## 2025-04-30 RX ADMIN — ROCURONIUM BROMIDE 10 MG: 10 INJECTION, SOLUTION INTRAVENOUS at 14:27

## 2025-04-30 ASSESSMENT — PAIN - FUNCTIONAL ASSESSMENT
PAIN_FUNCTIONAL_ASSESSMENT: NONE - DENIES PAIN
PAIN_FUNCTIONAL_ASSESSMENT: 0-10

## 2025-04-30 ASSESSMENT — PAIN SCALES - GENERAL: PAINLEVEL_OUTOF10: 3

## 2025-04-30 ASSESSMENT — PAIN DESCRIPTION - LOCATION: LOCATION: HEAD

## 2025-04-30 NOTE — H&P
Endovascular Neurosurgery Note    Pt Name: Jeremy Teran  MRN: [id number]  YOB: 1980  Date of evaluation: 4/30/2025  Primary Care Physician: Jacky William MD  Reason for evaluation: L ICA pseudoaneurysm     SUBJECTIVE:     Presents for elective aneurysm embolization of the known vertebral pseudoaneurysm. Has been taking Aspirin and Plavix. No new complaints. Discussed the procedure.    SUBJECTIVE:     History of Chief Complaint:    Jeremy Teran is a 45 y.o. 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  Patient states that this dizziness has been for 3 weeks and was evaluated at [facility] ER where CTA head and neck done did not show short segment fusiform dilatation of the right vertebral artery V4 segment measuring up to 4.5 mm as well as short segment irregularity at the mid to distal left cervical ICA possible short segment dissection.  At the time neuroendovascular has been consulted through telemedicine and recommended outpatient neurology consultation with daily aspirin.  Patient states that the dizziness gets better with meclizine but today it has been ongoing for 2 hours with intermittent relief.  There is no loss of consciousness, he describes the dizziness as a spinning sensation associated with blurring of vision during the attack but no diplopia, visual loss.  He denies tinnitus, hearing loss, upper respiratory tract infection symptoms, headaches, head injury, limb weakness but mentions that pain in his left arm along with tingling sensation of fourth and fifth fingers of left hand.  Patient states that he was told at [facility] that whenever his symptoms get worse to go to [facility] which brought him here today.     Neuroendovascular was consulted regarding incidental finding of left distal cervical ica pseudoaneurysm and underlying dissection  [Restricted in physically strenuous activity but ambulatory and able to carry out work of a light or sedentary nature] : Status 1- Restricted in physically strenuous activity but ambulatory and able to carry out work of a light or sedentary nature, e.g., light house work, office work [Normal] : affect appropriate [de-identified] : soft and distended, non-tender. [de-identified] : post inflammatory hyperpigmentation

## 2025-04-30 NOTE — ANESTHESIA PRE PROCEDURE
Department of Anesthesiology  Preprocedure Note       Name:  Jeremy Teran   Age:  45 y.o.  :  1980                                          MRN:  [id number]         Date:  2025      Surgeon: * No surgeons listed *    Procedure: * No procedures listed *    Medications prior to admission:   Prior to Admission medications    Medication Sig Start Date End Date Taking? Authorizing Provider   aspirin 81 MG EC tablet Take 1 tablet by mouth daily for 7 days Start 7 days prior to procedure 25 Yes Rush Thakur MD   clopidogrel (PLAVIX) 75 MG tablet Take 1 tablet by mouth daily for 7 days Start 7 days prior to procedure 25 Yes Rush Thakur MD   omeprazole (PRILOSEC) 20 MG delayed release capsule Take 1 capsule by mouth daily 10/23/24  Yes Rosalind Herron MD   fluticasone (FLONASE) 50 MCG/ACT nasal spray 2 sprays by Nasal route daily 10/23/24  Yes Rosalind Herron MD   Loratadine (CLARITIN PO) Take 1 tablet by mouth daily   Yes Rosalind Herron MD   meclizine (ANTIVERT) 25 MG tablet Take 1 tablet by mouth 3 times daily as needed  Patient not taking: Reported on 3/26/2025 10/9/24   ProviderRosalind MD       Current medications:    Current Outpatient Medications   Medication Sig Dispense Refill   • aspirin 81 MG EC tablet Take 1 tablet by mouth daily for 7 days Start 7 days prior to procedure 7 tablet 0   • clopidogrel (PLAVIX) 75 MG tablet Take 1 tablet by mouth daily for 7 days Start 7 days prior to procedure 7 tablet 0   • omeprazole (PRILOSEC) 20 MG delayed release capsule Take 1 capsule by mouth daily     • fluticasone (FLONASE) 50 MCG/ACT nasal spray 2 sprays by Nasal route daily     • Loratadine (CLARITIN PO) Take 1 tablet by mouth daily     • meclizine (ANTIVERT) 25 MG tablet Take 1 tablet by mouth 3 times daily as needed (Patient not taking: Reported on 3/26/2025)       No current facility-administered medications for this encounter.       Allergies:

## 2025-04-30 NOTE — PROGRESS NOTES
Patient transferred to Neuro ICU on monitor with RN. Pt tolerated transfer well and remained stable for the duration. Manjula AGUIAR to resume care on Neuro ICU.

## 2025-04-30 NOTE — H&P
History and Physical  Neuro Critical Care    Patient Name: Jeremy Teran  Patient : 1980  Room/Bed: 0129/0129-01  Code Status: Full  Allergies:   Allergies   Allergen Reactions    Codeine Nausea And Vomiting    Flagyl [Metronidazole] Rash       CHIEF COMPLAINT:      No chief complaint on file.  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  Aneurysm was initially found in 2024.  At that time he was treated using Surpass evolve flow diverter with post angioplasty    Patient also was found to have what thought to be fusiform aneurysm of the right V4 segment.  Today on 2025 patient was admitted after elective treatment of right V4 dissecting pseudoaneurysm.  He has been on aspirin and Plavix, and to be continued.    Patient underwent diagnostic angiogram showing vertebral artery V4 dissecting aneurysm measuring 5.57 in width and 8.55 in length.  This was treated using LVIS Chalino 4 x 27 mm device across the aneurysm.  Achieved RR score III    Radial artery was used.    Upon arrival patient is alert oriented x 3.  No focal neurodeficits.  Systolic blood pressure within  CURRENT MEDICATIONS:  SCHEDULED MEDICATIONS:   sodium chloride flush  5-40 mL IntraVENous 2 times per day    sodium chloride flush  5-40 mL IntraVENous 2 times per day    [START ON 2025] aspirin  81 mg Oral Daily    [START ON 2025] clopidogrel  75 mg Oral Daily     CONTINUOUS INFUSIONS:   sodium chloride      sodium chloride       PRN MEDICATIONS:   sodium chloride flush, sodium chloride, sodium chloride flush, sodium chloride, labetalol, hydrALAZINE, acetaminophen, ondansetron    VITALS:  Temperature Range: Temp: 96.8 °F (36 °C) Temp  Av.3 °F (36.3 °C)  Min: 96.8 °F (36 °C)  Max: 97.7 °F (36.5 °C)  BP Range:  as above, who presents for elective treatment of known right V4 segment dissecting aneurysm.  Patient also has a known left cervical ICA pseudoaneurysm status post surpass flow diverter embolization in 2024.  Patient comes in after treatment as above using LVIS Chalino stent, patient without focal neurological deficits post embolization    NEUROLOGIC:  Left cervical ica pseudoaneurysm, s/p surpass flow diverter embolization ()  R V4 dissecting aneurysm treatment using LVIS Chalino, POD #0  - CTA done 10/29, showing saccular distal cervical left ICA aneurysm. Fusiform aneurysm of V4 segment of R vertebral artery.  - Goal -140  - Continue ASA and Plavix 81 and 75 mg, respectively  - Endovascular neurosurgery recommendations    - Utilize PRNs as appropriate.  - Neuro checks per protocol    CARDIOVASCULAR:  No active issues   - Goal SBP <140  - Continue telemetry    PULMONARY:  No active issues  - Satting well on room air.     RENAL/FLUID/ELECTROLYTE:  No active issues  Recent Labs     25  1209   CREATININE 0.9       Intake/Output Summary (Last 24 hours) at 2025 1808  Last data filed at 2025 1515  Gross per 24 hour   Intake 1400 ml   Output 450 ml   Net 950 ml     - Replace electrolytes PRN  - Daily BMP    GI/NUTRITION:  GERD  NUTRITION:  ADULT DIET; Regular  - Bowel regimen: Glycolax PRN  - Omeprazole 20 mg daily. Can substitute with Pantoprazole.    ID:  No active issues  Temp (24hrs), Av.3 °F (36.3 °C), Min:96.8 °F (36 °C), Max:97.7 °F (36.5 °C)    No results for input(s): \"WBC\" in the last 72 hours.  - Continue to monitor for fevers  - Daily CBC    HEME:   No active issues  No results for input(s): \"HGB\", \"HCT\", \"PLT\" in the last 72 hours.  - Daily CBC    ENDOCRINE:  No active issues  - Continue to monitor blood glucose, goal <180  - No results for input(s): \"GLUCOSE\" in the last 72 hours.      OTHER:  - PT/OT/ST     PROPHYLAXIS:  Stress ulcer: PPI    DVT PROPHYLAXIS:  Lovenox 30

## 2025-04-30 NOTE — BRIEF OP NOTE
[facility] Stroke [facility]    NEUROENDOVASCULAR SERVICE: POST-OP NOTE: 4/30/2025    Pt Name: Jeremy Teran  MRN: [id number]  YOB: 1980  Date of Procedure: 4/30/2025  Primary Care Physician: Jacky William MD      Pre-Procedural Diagnosis: Right vertebral artery V4 dissecting pseudoaneurysm  Post-Procedural Diagnosis: Status post FD embolization using over stent      Procedure Performed:Diagnostic Cerebral Angiogram on the right vertebral artery V4 dissecting aneurysm with embolization    Surgeon:   Shivam Foss MD    Fellow:  Roque Schmid MD and Rush Thakur MD PhD     Assisting Tech:  Padmini Crooks    PRE-PROCEDURAL EXAM:  Neurological exam performed and unchanged from initial H&P or consult      Anesthesia: General Anesthesia  An Immediate re-assessment was completed prior to sedation, and it is determined to be safe to proceed.  Complications: none    Intra-Operative EXAM:  Patient sedated with unchanged limited neurological exam    EBL: < Minimal      Cc            Specimens: Were not Obtained  Contrast:     Visipaque 270 low osmolar 48 Cc             Fluoro: 16 min    Findings:  Please see dictated Radiology note for further details  Right vertebral artery V4 dissecting aneurysm measuring 5.57 mm in width x 8.55 mm in length with a vessel diameter averaging 3.5 mm .   The above lesion was treated with 6 Singaporean short sheath radial, 6F Zebra 074, SIM 2 diagnostic catheter and glide wire.   Once the aneurysm surgically evaluated.  Headway 17 and Synchro Support Pre-shaped were advanced coaxially across the aneurysm into the vertebral basilar segment  The microwire was removed and a LVIS Chalino 4 x 27 mm device was advanced and deployed across the aneurysm  Final angiographic run demonstrated patent well apposed FD system across the aneurysm with contrast stagnation in the aneurysmal sac.                         Swapnil score: class III      1318  1356  1450    POST-PROCEDURAL

## 2025-04-30 NOTE — SEDATION DOCUMENTATION
200 mcg nitroglycerin  2000 units Heparin  2.5 mg Verapamil    Mixed together in sterile fashion for injection of radial artery access   Verbal order by Dr. Thakur

## 2025-04-30 NOTE — ANESTHESIA POSTPROCEDURE EVALUATION
Department of Anesthesiology  Postprocedure Note    Patient: Jeremy Teran  MRN: [id number]  YOB: 1980  Date of evaluation: 4/30/2025    Procedure Summary       Date: 04/30/25 Room / Location: [facility] Special Procedures    Anesthesia Start: 1319 Anesthesia Stop: 1526    Procedure: IR ANGIOGRAM CAROTID CEREBRAL BILATERAL Diagnosis:       Aneurysm of right vertebral artery      Vertebral artery embolism, right    Scheduled Providers: Jong Herr APRN - CRNA Responsible Provider: Blayne West MD    Anesthesia Type: general ASA Status: 3            Anesthesia Type: No value filed.    Nanci Phase I: Nanci Score: 9    Nanci Phase II:      Anesthesia Post Evaluation    Patient location during evaluation: bedside  Patient participation: complete - patient participated  Level of consciousness: awake  Airway patency: patent  Nausea & Vomiting: no nausea and no vomiting  Cardiovascular status: blood pressure returned to baseline  Respiratory status: acceptable  Hydration status: euvolemic  Comments: BP (!) 122/90   Pulse 69   Temp 96.8 °F (36 °C) (Temporal)   Resp 16   Ht 1.778 m (5' 10\")   Wt 76.2 kg (168 lb)   SpO2 98%   BMI 24.11 kg/m²     Pain management: adequate    No notable events documented.

## 2025-05-01 VITALS
BODY MASS INDEX: 24.47 KG/M2 | DIASTOLIC BLOOD PRESSURE: 85 MMHG | OXYGEN SATURATION: 97 % | WEIGHT: 170.9 LBS | HEART RATE: 92 BPM | TEMPERATURE: 98.3 F | HEIGHT: 70 IN | RESPIRATION RATE: 17 BRPM | SYSTOLIC BLOOD PRESSURE: 130 MMHG

## 2025-05-01 PROBLEM — I72.6: Status: ACTIVE | Noted: 2025-05-01

## 2025-05-01 LAB
ANION GAP SERPL CALCULATED.3IONS-SCNC: 11 MMOL/L (ref 9–16)
BASOPHILS # BLD: 0 K/UL (ref 0–0.2)
BASOPHILS NFR BLD: 0 % (ref 0–2)
BUN SERPL-MCNC: 7 MG/DL (ref 6–20)
CALCIUM SERPL-MCNC: 9.3 MG/DL (ref 8.6–10.4)
CHLORIDE SERPL-SCNC: 103 MMOL/L (ref 98–107)
CO2 SERPL-SCNC: 21 MMOL/L (ref 20–31)
CREAT SERPL-MCNC: 0.8 MG/DL (ref 0.7–1.2)
EOSINOPHIL # BLD: 0 K/UL (ref 0–0.4)
EOSINOPHILS RELATIVE PERCENT: 0 % (ref 1–4)
ERYTHROCYTE [DISTWIDTH] IN BLOOD BY AUTOMATED COUNT: 12.4 % (ref 11.8–14.4)
GFR, ESTIMATED: >90 ML/MIN/1.73M2
GLUCOSE SERPL-MCNC: 144 MG/DL (ref 74–99)
HCT VFR BLD AUTO: 43 % (ref 40.7–50.3)
HGB BLD-MCNC: 14.4 G/DL (ref 13–17)
IMM GRANULOCYTES # BLD AUTO: 0 K/UL (ref 0–0.3)
IMM GRANULOCYTES NFR BLD: 0 %
LYMPHOCYTES NFR BLD: 0.62 K/UL (ref 1–4.8)
LYMPHOCYTES RELATIVE PERCENT: 11 % (ref 24–44)
MCH RBC QN AUTO: 30.6 PG (ref 25.2–33.5)
MCHC RBC AUTO-ENTMCNC: 33.5 G/DL (ref 28.4–34.8)
MCV RBC AUTO: 91.3 FL (ref 82.6–102.9)
MONOCYTES NFR BLD: 0.06 K/UL (ref 0.1–0.8)
MONOCYTES NFR BLD: 1 % (ref 1–7)
MORPHOLOGY: NORMAL
NEUTROPHILS NFR BLD: 88 % (ref 36–66)
NEUTS SEG NFR BLD: 4.92 K/UL (ref 1.8–7.7)
NRBC BLD-RTO: 0 PER 100 WBC
PLATELET # BLD AUTO: 269 K/UL (ref 138–453)
PMV BLD AUTO: 9.9 FL (ref 8.1–13.5)
POTASSIUM SERPL-SCNC: 4.1 MMOL/L (ref 3.7–5.3)
RBC # BLD AUTO: 4.71 M/UL (ref 4.21–5.77)
SODIUM SERPL-SCNC: 135 MMOL/L (ref 136–145)
WBC OTHER # BLD: 5.6 K/UL (ref 3.5–11.3)

## 2025-05-01 PROCEDURE — 36415 COLL VENOUS BLD VENIPUNCTURE: CPT

## 2025-05-01 PROCEDURE — 2500000003 HC RX 250 WO HCPCS: Performed by: ANESTHESIOLOGY

## 2025-05-01 PROCEDURE — 99223 1ST HOSP IP/OBS HIGH 75: CPT | Performed by: PSYCHIATRY & NEUROLOGY

## 2025-05-01 PROCEDURE — 2500000003 HC RX 250 WO HCPCS: Performed by: STUDENT IN AN ORGANIZED HEALTH CARE EDUCATION/TRAINING PROGRAM

## 2025-05-01 PROCEDURE — 85025 COMPLETE CBC W/AUTO DIFF WBC: CPT

## 2025-05-01 PROCEDURE — 6370000000 HC RX 637 (ALT 250 FOR IP)

## 2025-05-01 PROCEDURE — 80048 BASIC METABOLIC PNL TOTAL CA: CPT

## 2025-05-01 PROCEDURE — 6370000000 HC RX 637 (ALT 250 FOR IP): Performed by: STUDENT IN AN ORGANIZED HEALTH CARE EDUCATION/TRAINING PROGRAM

## 2025-05-01 PROCEDURE — 99233 SBSQ HOSP IP/OBS HIGH 50: CPT | Performed by: PSYCHIATRY & NEUROLOGY

## 2025-05-01 RX ORDER — ASPIRIN 81 MG/1
81 TABLET ORAL DAILY
Qty: 30 TABLET | Refills: 3 | Status: SHIPPED | OUTPATIENT
Start: 2025-05-01

## 2025-05-01 RX ORDER — CLOPIDOGREL BISULFATE 75 MG/1
75 TABLET ORAL DAILY
Qty: 30 TABLET | Refills: 1 | Status: SHIPPED | OUTPATIENT
Start: 2025-05-01

## 2025-05-01 RX ADMIN — PANTOPRAZOLE SODIUM 40 MG: 40 TABLET, DELAYED RELEASE ORAL at 05:58

## 2025-05-01 RX ADMIN — SODIUM CHLORIDE, PRESERVATIVE FREE 10 ML: 5 INJECTION INTRAVENOUS at 08:15

## 2025-05-01 RX ADMIN — ASPIRIN 81 MG: 81 TABLET, COATED ORAL at 08:21

## 2025-05-01 RX ADMIN — ACETAMINOPHEN 650 MG: 325 TABLET ORAL at 06:03

## 2025-05-01 RX ADMIN — CLOPIDOGREL BISULFATE 75 MG: 75 TABLET, FILM COATED ORAL at 08:21

## 2025-05-01 ASSESSMENT — PAIN SCALES - GENERAL
PAINLEVEL_OUTOF10: 3
PAINLEVEL_OUTOF10: 0
PAINLEVEL_OUTOF10: 0

## 2025-05-01 ASSESSMENT — PAIN DESCRIPTION - LOCATION: LOCATION: HEAD

## 2025-05-01 NOTE — PROGRESS NOTES
Endovascular Neurosurgery Note    Pt Name: Jeremy Teran  MRN: [id number]  YOB: 1980  Date of evaluation: 5/1/2025  Primary Care Physician: Jacky William MD  Reason for evaluation: L ICA pseudoaneurysm     SUBJECTIVE:     S/p flow diverter placement yesterday. Today AM doing well, no new complaints, no headaches or other symptoms. No new focal neuro deficits.     SUBJECTIVE:     History of Chief Complaint:    Jeremy Teran is a 45 y.o. 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  Patient states that this dizziness has been for 3 weeks and was evaluated at [facility] where CTA head and neck done did not show short segment fusiform dilatation of the right vertebral artery V4 segment measuring up to 4.5 mm as well as short segment irregularity at the mid to distal left cervical ICA possible short segment dissection.  At the time neuroendovascular has been consulted through telemedicine and recommended outpatient neurology consultation with daily aspirin.  Patient states that the dizziness gets better with meclizine but today it has been ongoing for 2 hours with intermittent relief.  There is no loss of consciousness, he describes the dizziness as a spinning sensation associated with blurring of vision during the attack but no diplopia, visual loss.  He denies tinnitus, hearing loss, upper respiratory tract infection symptoms, headaches, head injury, limb weakness but mentions that pain in his left arm along with tingling sensation of fourth and fifth fingers of left hand.  Patient states that he was told at [facility] that whenever his symptoms get worse to go to [facility] which brought him here today.     Neuroendovascular was consulted regarding incidental finding of left distal cervical ica pseudoaneurysm and underlying dissection     Allergies  is allergic

## 2025-05-01 NOTE — DISCHARGE SUMMARY
Neuro Critical Care   Discharge Summary      PATIENT NAME: Jeremy Teran  YOB: 1980  MEDICAL RECORD NO. 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 yo male who presented to [facility] on 4/30/2025  1:43 PM status post elective Diagnostic Cerebral Angiogram on the right vertebral artery V4 dissecting aneurysm with embolization.  Patient was transferred to the neuro ICU postprocedure for close neurological monitoring and remained hemodynamically neurologically stable without immediate complication and was then discharged home with instructions to continue taking aspirin and Plavix daily and follow-up with neuroendovascular outpatient.    Labs and imaging were followed daily.  At time of discharge, Jeremy Teran was tolerating a ADULT DIET; Regular, having bowel movements, and is in stable condition to be discharged to home.        PROCEDURES:    4/30: Diagnostic Cerebral Angiogram on the right vertebral artery V4 dissecting aneurysm with embolization     PHYSICAL EXAMINATION        Discharge Vitals:  height is 1.778 m (5' 10\") and weight is 77.5 kg (170 lb 14.4 oz). His oral temperature is 98.3 °F (36.8 °C). His blood pressure is 134/84 and his pulse is 90. His respiration is 21 and oxygen saturation is 97%.     CONSTITUTIONAL:  Well developed, well nourished, alert and oriented x 3, in no acute distress. GCS 15. Nontoxic. No dysarthria. No aphasia.   HEAD:  normocephalic, atraumatic    EYES:  PERRLA, EOMI.   ENT:  moist mucous membranes   NECK:  supple, symmetric   LUNGS:  Equal air entry  bilaterally   CARDIOVASCULAR:  normal s1 / s2, RRR, distal pulses intact   ABDOMEN:  Soft, no rigidity   NEUROLOGIC:  Mental Status:  A & O x3,awake             Cranial Nerves:    cranial nerves II-XII are grossly intact     Motor Exam:    Drift:  absent  Tone:  normal     Motor exam is symmetrical 5 out of 5 all extremities bilaterally     Sensory:    Touch:    Right Upper Extremity:  normal  Left Upper Extremity:  normal  Right Lower Extremity:  normal  Left Lower Extremity:  normal      NIH Stroke Scale Total (if not done complete detailed one below):    1a.  Level of consciousness:  0 - alert; keenly responsive  1b.  Level of consciousness questions:  0 - answers both questions correctly  1c.  Level of consciousness questions:  0 - performs both tasks correctly  2.    Best Gaze:  0 - normal  3.    Visual:  0 - no visual loss  4.    Facial Palsy:  0 - normal symmetric movement  5a.  Motor left arm:  0 - no drift, limb holds 90 (or 45) degrees for full 10 seconds  5b.  Motor right arm:  0 - no drift, limb holds 90 (or 45) degrees for full 10 seconds  6a.  Motor left le - no drift; leg holds 30 degree position for full 5 seconds  6b.  Motor right le - no drift; leg holds 30 degree position for full 5 seconds  7.    Limb Ataxia:  0 - absent  8.    Sensory:  0 - normal; no sensory loss  9.    Best Language:  0 - no aphasia, normal  10.  Dysarthria:  0 - normal  11.  Extinction and Inattention:  0 - no abnormality  TOTAL: 0      LABS/IMAGING     Recent Labs     25  1209 25  0320   WBC  --  5.6   HGB  --  14.4   HCT  --  43.0   PLT  --  269   NA  --  135*   K  --  4.1   CL  --  103   CO2  --  21   BUN  --  7   CREATININE 0.9 0.8       IR ANGIOGRAM CAROTID CEREBRAL BILATERAL  Result Date: 2025  Date of Service: 2025 Patient arrived to the angio suite at: 1318 Puncture obtained at: 1356 Vascular access was removed at: 1450 Manual pressure for minutes: N/A Patient wheeled out of the angio

## 2025-05-01 NOTE — PROGRESS NOTES
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  Aneurysm was initially found in 2024.  At that time he was treated using Surpass evolve flow diverter with post angioplasty     Patient also was found to have what thought to be fusiform aneurysm of the right V4 segment.  Today on 2025 patient was admitted after elective treatment of right V4 dissecting pseudoaneurysm.  He has been on aspirin and Plavix, and to be continued.     Patient underwent diagnostic angiogram showing vertebral artery V4 dissecting aneurysm measuring 5.57 in width and 8.55 in length.  This was treated using LVIS Chalino 4 x 27 mm device across the aneurysm.  Achieved RR score III    Interval Events:   No acute events overnight.  Remains hemodynamically and neurologically stable. Radial puncture site intact without evidence of hematoma or drainage. Continues on Aspirin and Plavix, plan for discharge home today with outpatient neuro endovascular follow up.     CURRENT MEDICATIONS:  SCHEDULED MEDICATIONS:   sodium chloride flush  5-40 mL IntraVENous 2 times per day    sodium chloride flush  5-40 mL IntraVENous 2 times per day    aspirin  81 mg Oral Daily    clopidogrel  75 mg Oral Daily    pantoprazole  40 mg Oral QAM AC     CONTINUOUS INFUSIONS:   sodium chloride      sodium chloride       PRN MEDICATIONS:   sodium chloride flush, sodium chloride, sodium chloride flush, sodium chloride, labetalol, hydrALAZINE, acetaminophen, ondansetron    VITALS:  Temperature Range:  leg holds 30 degree position for full 5 seconds  6b.  Motor right le - no drift; leg holds 30 degree position for full 5 seconds  7.    Limb Ataxia:  0 - absent  8.    Sensory:  0 - normal; no sensory loss  9.    Best Language:  0 - no aphasia, normal  10.  Dysarthria:  0 - normal  11.  Extinction and Inattention:  0 - no abnormality  TOTAL: 0    DRAINS:  [x] There are no drains for Neuro Critical Care to monitor at this time.     ASSESSMENT AND PLAN:       Patient is a 45 y.o. male with PMH as above, who presents for elective treatment of known right V4 segment dissecting aneurysm.  Patient also has a known left cervical ICA pseudoaneurysm status post surpass flow diverter embolization in 2024.  Patient comes in after treatment as above using LVIS Chalino stent.     Plan of care discussed with attending physician      NEUROLOGIC:  Right vertebral artery dissecting pseudoaneurysm   Left cervical ICA pseudoaneurysm s/p surpass flow diverter embolization ()   - s/p flow diverter embolization of the right vertebral pseudoaneurysm ()  - Neuro endovascular following, appreciate recommendations   - Continue Aspirin 81 mg daily and Plavix 75 mg daily   - Goal -140  - Neuro checks per protocol    CARDIOVASCULAR:  - Goal -140  - As needed labetalol and hydralazine  - Remove arterial line  - Continue telemetry    PULMONARY:  - Oxygenating well on room air    RENAL/FLUID/ELECTROLYTE:  - BUN 7/ Creatinine 0.8  - Monitor intake and output  - Replace electrolytes PRN  - Daily BMP    GI/NUTRITION:  NUTRITION:  ADULT DIET; Regular  - Bowel regimen: GlycoLax as needed    ID:  - Afebrile  - WBC 5.6  - Continue to monitor for fevers  - Daily CBC    HEME:   - H&H 14.4/43.0  - Platelets 269  - Daily CBC    ENDOCRINE:  - Continue to monitor blood glucose, goal <180    OTHER:  - PT/OT    DVT PROPHYLAXIS:  - SCD sleeves - Thigh High   - Aspirin  - Plavix    DISPOSITION:  [] To remain ICU: **  [x] OK for out

## 2025-05-01 NOTE — PLAN OF CARE
Problem: Pain  Goal: Verbalizes/displays adequate comfort level or baseline comfort level  5/1/2025 1209 by Kylah Massey RN  Outcome: Adequate for Discharge  5/1/2025 0012 by Sharri Goldberg RN  Outcome: Progressing     Problem: Discharge Planning  Goal: Discharge to home or other facility with appropriate resources  5/1/2025 1209 by Kylah Massey RN  Outcome: Adequate for Discharge  5/1/2025 0601 by Mariam Crain RN  Outcome: Progressing  5/1/2025 0012 by Sharri Goldberg RN  Outcome: Progressing     Problem: Skin/Tissue Integrity  Goal: Skin integrity remains intact  Description: 1.  Monitor for areas of redness and/or skin breakdown2.  Assess vascular access sites hourly3.  Every 4-6 hours minimum:  Change oxygen saturation probe site4.  Every 4-6 hours:  If on nasal continuous positive airway pressure, respiratory therapy assess nares and determine need for appliance change or resting period  5/1/2025 1209 by Kylah Massey RN  Outcome: Adequate for Discharge  5/1/2025 0601 by Mariam Crain RN  Outcome: Progressing  5/1/2025 0012 by Sharri Goldberg RN  Outcome: Progressing  Flowsheets (Taken 5/1/2025 0000)  Skin Integrity Remains Intact: Monitor for areas of redness and/or skin breakdown     Problem: Safety - Adult  Goal: Free from fall injury  5/1/2025 1209 by Kylah Massey RN  Outcome: Adequate for Discharge  5/1/2025 0601 by Mariam Crain RN  Outcome: Progressing  5/1/2025 0012 by Sharri Goldberg RN  Outcome: Progressing     Problem: ABCDS Injury Assessment  Goal: Absence of physical injury  5/1/2025 1209 by Kyalh Massey RN  Outcome: Adequate for Discharge  5/1/2025 0012 by Sharri Goldberg RN  Outcome: Progressing

## 2025-05-01 NOTE — PLAN OF CARE
Problem: Pain  Goal: Verbalizes/displays adequate comfort level or baseline comfort level  Outcome: Progressing     Problem: Discharge Planning  Goal: Discharge to home or other facility with appropriate resources  Outcome: Progressing     Problem: Skin/Tissue Integrity  Goal: Skin integrity remains intact  Description: 1.  Monitor for areas of redness and/or skin breakdown2.  Assess vascular access sites hourly3.  Every 4-6 hours minimum:  Change oxygen saturation probe site4.  Every 4-6 hours:  If on nasal continuous positive airway pressure, respiratory therapy assess nares and determine need for appliance change or resting period  Outcome: Progressing  Flowsheets (Taken 5/1/2025 0000)  Skin Integrity Remains Intact: Monitor for areas of redness and/or skin breakdown     Problem: Safety - Adult  Goal: Free from fall injury  Outcome: Progressing     Problem: ABCDS Injury Assessment  Goal: Absence of physical injury  Outcome: Progressing

## 2025-05-01 NOTE — PLAN OF CARE
Problem: Pain  Goal: Verbalizes/displays adequate comfort level or baseline comfort level  5/1/2025 0012 by Sharri Goldberg RN  Outcome: Progressing     Problem: Discharge Planning  Goal: Discharge to home or other facility with appropriate resources  5/1/2025 0601 by Mariam Crain RN  Outcome: Progressing  5/1/2025 0012 by Sharri Goldberg RN  Outcome: Progressing     Problem: Skin/Tissue Integrity  Goal: Skin integrity remains intact  Description: 1.  Monitor for areas of redness and/or skin breakdown2.  Assess vascular access sites hourly3.  Every 4-6 hours minimum:  Change oxygen saturation probe site4.  Every 4-6 hours:  If on nasal continuous positive airway pressure, respiratory therapy assess nares and determine need for appliance change or resting period  5/1/2025 0601 by Mariam Crain RN  Outcome: Progressing  5/1/2025 0012 by Sharri Goldberg RN  Outcome: Progressing  Flowsheets (Taken 5/1/2025 0000)  Skin Integrity Remains Intact: Monitor for areas of redness and/or skin breakdown     Problem: Safety - Adult  Goal: Free from fall injury  5/1/2025 0601 by Mariam Crain RN  Outcome: Progressing  5/1/2025 0012 by Sharri Goldberg RN  Outcome: Progressing     Problem: ABCDS Injury Assessment  Goal: Absence of physical injury  5/1/2025 0012 by Sharri Goldberg RN  Outcome: Progressing

## 2025-05-01 NOTE — PROGRESS NOTES
Spiritual Health History and Assessment/Progress Note  [facility]    Initial Encounter,    Name: Jeremy Teran MRN: [id number]    Age: 45 y.o.     Sex: male   Language: English   Yazidism: None   <principal problem not specified>     Date: 5/1/2025            Total Time Calculated: (P) 12 min              Spiritual Assessment began in [facility] NEURO ICU        Referral/Consult From: Rounding   Encounter Overview/Reason: Initial Encounter  Service Provided For: Patient    Narrative:  visited patient per initial rounding visits on NeuroICU. Patient was sitting up in hospital bed, when  visited. Patient shared that he had \"felt dizzy\" at work and a coworker brought him to the hospital. Per patient, he was found to have an \"aneurysm\" and underwent surgery. Per patient, this is his \"second aneurysm.\" Patient stated, \"God's looking out for me.\" Patient was coping well and receptive of 's visit.    Ritika, Belief, Meaning:   Patient has beliefs or practices that help with coping during difficult times  Family/Friends No family/friends present      Importance and Influence:  Patient has spiritual/personal beliefs that influence decisions regarding their health  Family/Friends No family/friends present    Community:  Patient feels well-supported. Support system includes: Spouse/Partner  Family/Friends No family/friends present    Assessment and Plan of Care:     Patient Interventions include: Facilitated expression of thoughts and feelings and Affirmed coping skills/support systems  Family/Friends Interventions include: No family/friends present    Patient Plan of Care: Spiritual Care available upon further referral  Family/Friends Plan of Care: Spiritual Care available upon further referral    Electronically signed by JOHANNA Jennings on 5/1/2025 at 5:00 AM

## 2025-05-14 ENCOUNTER — APPOINTMENT (OUTPATIENT)
Dept: CT IMAGING | Age: 45
DRG: 948 | End: 2025-05-14
Payer: COMMERCIAL

## 2025-05-14 ENCOUNTER — APPOINTMENT (OUTPATIENT)
Dept: GENERAL RADIOLOGY | Age: 45
DRG: 948 | End: 2025-05-14
Payer: COMMERCIAL

## 2025-05-14 ENCOUNTER — APPOINTMENT (OUTPATIENT)
Dept: MRI IMAGING | Age: 45
DRG: 948 | End: 2025-05-14
Payer: COMMERCIAL

## 2025-05-14 ENCOUNTER — HOSPITAL ENCOUNTER (INPATIENT)
Age: 45
LOS: 1 days | Discharge: HOME | DRG: 948 | End: 2025-05-15
Attending: EMERGENCY MEDICINE | Admitting: PSYCHIATRY & NEUROLOGY
Payer: COMMERCIAL

## 2025-05-14 DIAGNOSIS — R20.2 PARESTHESIAS: Primary | ICD-10-CM

## 2025-05-14 DIAGNOSIS — Z98.890 HISTORY OF CEREBRAL ANEURYSM REPAIR: ICD-10-CM

## 2025-05-14 DIAGNOSIS — Z86.79 HISTORY OF CEREBRAL ANEURYSM REPAIR: ICD-10-CM

## 2025-05-14 PROBLEM — Z95.828 PRESENCE OF INTERNAL CAROTID STENT: Status: ACTIVE | Noted: 2025-05-14

## 2025-05-14 PROBLEM — R29.898 LEFT LEG WEAKNESS: Status: ACTIVE | Noted: 2025-05-14

## 2025-05-14 LAB
ANION GAP SERPL CALCULATED.3IONS-SCNC: 11 MMOL/L (ref 9–16)
BUN BLD-MCNC: 5 MG/DL (ref 8–26)
BUN SERPL-MCNC: 6 MG/DL (ref 6–20)
CA-I BLD-SCNC: 1.25 MMOL/L (ref 1.15–1.33)
CALCIUM SERPL-MCNC: 10 MG/DL (ref 8.6–10.4)
CHLORIDE BLD-SCNC: 97 MMOL/L (ref 98–107)
CHLORIDE SERPL-SCNC: 99 MMOL/L (ref 98–107)
CO2 BLD CALC-SCNC: 31 MMOL/L (ref 22–30)
CO2 SERPL-SCNC: 26 MMOL/L (ref 20–31)
CREAT SERPL-MCNC: 0.9 MG/DL (ref 0.7–1.2)
EGFR, POC: >90 ML/MIN/1.73M2
ERYTHROCYTE [DISTWIDTH] IN BLOOD BY AUTOMATED COUNT: 12.3 % (ref 11.8–14.4)
ETHANOL PERCENT: <0.01 %
ETHANOLAMINE SERPL-MCNC: <10 MG/DL (ref 0–0.08)
GFR, ESTIMATED: >90 ML/MIN/1.73M2
GLUCOSE BLD-MCNC: 98 MG/DL (ref 74–100)
GLUCOSE SERPL-MCNC: 96 MG/DL (ref 74–99)
HCO3 VENOUS: 32 MMOL/L (ref 22–29)
HCT VFR BLD AUTO: 45.8 % (ref 40.7–50.3)
HCT VFR BLD AUTO: 49 % (ref 41–53)
HGB BLD-MCNC: 15.8 G/DL (ref 13–17)
MCH RBC QN AUTO: 31.4 PG (ref 25.2–33.5)
MCHC RBC AUTO-ENTMCNC: 34.5 G/DL (ref 28.4–34.8)
MCV RBC AUTO: 91.1 FL (ref 82.6–102.9)
NRBC BLD-RTO: 0 PER 100 WBC
O2 SAT, VEN: 43.3 % (ref 60–85)
PCO2 VENOUS: 54.3 MM HG (ref 41–51)
PH VENOUS: 7.38 (ref 7.32–7.43)
PLATELET # BLD AUTO: 296 K/UL (ref 138–453)
PMV BLD AUTO: 9.6 FL (ref 8.1–13.5)
PO2 VENOUS: 25.3 MM HG (ref 30–50)
POC ANION GAP: 8 MMOL/L (ref 7–16)
POC CREATININE: 0.8 MG/DL (ref 0.51–1.19)
POC HEMOGLOBIN (CALC): 16.8 G/DL (ref 13.5–17.5)
POC LACTIC ACID: 1 MMOL/L (ref 0.56–1.39)
POSITIVE BASE EXCESS, VEN: 5 MMOL/L (ref 0–3)
POTASSIUM BLD-SCNC: 3.6 MMOL/L (ref 3.5–4.5)
POTASSIUM SERPL-SCNC: 4.1 MMOL/L (ref 3.7–5.3)
RBC # BLD AUTO: 5.03 M/UL (ref 4.21–5.77)
SODIUM BLD-SCNC: 135 MMOL/L (ref 138–146)
SODIUM SERPL-SCNC: 136 MMOL/L (ref 136–145)
TROPONIN I SERPL HS-MCNC: <6 NG/L (ref 0–22)
WBC OTHER # BLD: 6 K/UL (ref 3.5–11.3)

## 2025-05-14 PROCEDURE — 99223 1ST HOSP IP/OBS HIGH 75: CPT | Performed by: PSYCHIATRY & NEUROLOGY

## 2025-05-14 PROCEDURE — 70450 CT HEAD/BRAIN W/O DYE: CPT

## 2025-05-14 PROCEDURE — 82947 ASSAY GLUCOSE BLOOD QUANT: CPT

## 2025-05-14 PROCEDURE — 6360000004 HC RX CONTRAST MEDICATION

## 2025-05-14 PROCEDURE — 2060000000 HC ICU INTERMEDIATE R&B

## 2025-05-14 PROCEDURE — 82565 ASSAY OF CREATININE: CPT

## 2025-05-14 PROCEDURE — G0480 DRUG TEST DEF 1-7 CLASSES: HCPCS

## 2025-05-14 PROCEDURE — 84484 ASSAY OF TROPONIN QUANT: CPT

## 2025-05-14 PROCEDURE — 93005 ELECTROCARDIOGRAM TRACING: CPT

## 2025-05-14 PROCEDURE — 85014 HEMATOCRIT: CPT

## 2025-05-14 PROCEDURE — 70496 CT ANGIOGRAPHY HEAD: CPT

## 2025-05-14 PROCEDURE — 2500000003 HC RX 250 WO HCPCS

## 2025-05-14 PROCEDURE — 82803 BLOOD GASES ANY COMBINATION: CPT

## 2025-05-14 PROCEDURE — 83605 ASSAY OF LACTIC ACID: CPT

## 2025-05-14 PROCEDURE — 85027 COMPLETE CBC AUTOMATED: CPT

## 2025-05-14 PROCEDURE — 84520 ASSAY OF UREA NITROGEN: CPT

## 2025-05-14 PROCEDURE — 70551 MRI BRAIN STEM W/O DYE: CPT

## 2025-05-14 PROCEDURE — 99285 EMERGENCY DEPT VISIT HI MDM: CPT

## 2025-05-14 PROCEDURE — 71045 X-RAY EXAM CHEST 1 VIEW: CPT

## 2025-05-14 PROCEDURE — 80051 ELECTROLYTE PANEL: CPT

## 2025-05-14 PROCEDURE — G0425 INPT/ED TELECONSULT30: HCPCS | Performed by: PSYCHIATRY & NEUROLOGY

## 2025-05-14 PROCEDURE — 6360000002 HC RX W HCPCS

## 2025-05-14 PROCEDURE — 82330 ASSAY OF CALCIUM: CPT

## 2025-05-14 PROCEDURE — 80048 BASIC METABOLIC PNL TOTAL CA: CPT

## 2025-05-14 RX ORDER — IOPAMIDOL 755 MG/ML
90 INJECTION, SOLUTION INTRAVASCULAR
Status: COMPLETED | OUTPATIENT
Start: 2025-05-14 | End: 2025-05-14

## 2025-05-14 RX ORDER — SODIUM CHLORIDE 9 MG/ML
INJECTION, SOLUTION INTRAVENOUS PRN
Status: DISCONTINUED | OUTPATIENT
Start: 2025-05-14 | End: 2025-05-15 | Stop reason: HOSPADM

## 2025-05-14 RX ORDER — SODIUM CHLORIDE 0.9 % (FLUSH) 0.9 %
5-40 SYRINGE (ML) INJECTION EVERY 12 HOURS SCHEDULED
Status: DISCONTINUED | OUTPATIENT
Start: 2025-05-14 | End: 2025-05-15 | Stop reason: HOSPADM

## 2025-05-14 RX ORDER — POTASSIUM CHLORIDE 1500 MG/1
40 TABLET, EXTENDED RELEASE ORAL PRN
Status: DISCONTINUED | OUTPATIENT
Start: 2025-05-14 | End: 2025-05-15 | Stop reason: HOSPADM

## 2025-05-14 RX ORDER — ONDANSETRON 2 MG/ML
4 INJECTION INTRAMUSCULAR; INTRAVENOUS EVERY 6 HOURS PRN
Status: DISCONTINUED | OUTPATIENT
Start: 2025-05-14 | End: 2025-05-15 | Stop reason: HOSPADM

## 2025-05-14 RX ORDER — POLYETHYLENE GLYCOL 3350 17 G/17G
17 POWDER, FOR SOLUTION ORAL DAILY PRN
Status: DISCONTINUED | OUTPATIENT
Start: 2025-05-14 | End: 2025-05-15 | Stop reason: HOSPADM

## 2025-05-14 RX ORDER — ACETAMINOPHEN 650 MG/1
650 SUPPOSITORY RECTAL EVERY 6 HOURS PRN
Status: DISCONTINUED | OUTPATIENT
Start: 2025-05-14 | End: 2025-05-15 | Stop reason: HOSPADM

## 2025-05-14 RX ORDER — ONDANSETRON 4 MG/1
4 TABLET, ORALLY DISINTEGRATING ORAL EVERY 8 HOURS PRN
Status: DISCONTINUED | OUTPATIENT
Start: 2025-05-14 | End: 2025-05-15 | Stop reason: HOSPADM

## 2025-05-14 RX ORDER — SODIUM CHLORIDE 0.9 % (FLUSH) 0.9 %
5-40 SYRINGE (ML) INJECTION PRN
Status: DISCONTINUED | OUTPATIENT
Start: 2025-05-14 | End: 2025-05-15 | Stop reason: HOSPADM

## 2025-05-14 RX ORDER — POTASSIUM CHLORIDE 7.45 MG/ML
10 INJECTION INTRAVENOUS PRN
Status: DISCONTINUED | OUTPATIENT
Start: 2025-05-14 | End: 2025-05-15 | Stop reason: HOSPADM

## 2025-05-14 RX ORDER — ENOXAPARIN SODIUM 100 MG/ML
40 INJECTION SUBCUTANEOUS EVERY 24 HOURS
Status: DISCONTINUED | OUTPATIENT
Start: 2025-05-14 | End: 2025-05-15 | Stop reason: HOSPADM

## 2025-05-14 RX ORDER — ACETAMINOPHEN 325 MG/1
650 TABLET ORAL EVERY 6 HOURS PRN
Status: DISCONTINUED | OUTPATIENT
Start: 2025-05-14 | End: 2025-05-15 | Stop reason: HOSPADM

## 2025-05-14 RX ORDER — ASPIRIN 81 MG/1
81 TABLET ORAL DAILY
Status: DISCONTINUED | OUTPATIENT
Start: 2025-05-15 | End: 2025-05-15 | Stop reason: HOSPADM

## 2025-05-14 RX ORDER — CLOPIDOGREL BISULFATE 75 MG/1
75 TABLET ORAL DAILY
Status: DISCONTINUED | OUTPATIENT
Start: 2025-05-15 | End: 2025-05-15 | Stop reason: HOSPADM

## 2025-05-14 RX ORDER — MAGNESIUM SULFATE IN WATER 40 MG/ML
2000 INJECTION, SOLUTION INTRAVENOUS PRN
Status: DISCONTINUED | OUTPATIENT
Start: 2025-05-14 | End: 2025-05-15 | Stop reason: HOSPADM

## 2025-05-14 RX ADMIN — IOPAMIDOL 90 ML: 755 INJECTION, SOLUTION INTRAVENOUS at 13:14

## 2025-05-14 RX ADMIN — SODIUM CHLORIDE, PRESERVATIVE FREE 10 ML: 5 INJECTION INTRAVENOUS at 21:59

## 2025-05-14 RX ADMIN — ENOXAPARIN SODIUM 40 MG: 100 INJECTION SUBCUTANEOUS at 17:52

## 2025-05-14 ASSESSMENT — ENCOUNTER SYMPTOMS
ABDOMINAL DISTENTION: 0
PHOTOPHOBIA: 0
ABDOMINAL PAIN: 0
COLOR CHANGE: 0
SHORTNESS OF BREATH: 0
CONSTIPATION: 0
RHINORRHEA: 0
NAUSEA: 0
COUGH: 0
VOMITING: 0
WHEEZING: 0
DIARRHEA: 0
BACK PAIN: 0

## 2025-05-14 ASSESSMENT — PAIN SCALES - GENERAL
PAINLEVEL_OUTOF10: 0
PAINLEVEL_OUTOF10: 0

## 2025-05-14 NOTE — ED PROVIDER NOTES
[facility]     Emergency Department     Faculty Attestation  1:25 PM EDT      I performed a history and physical examination of the patient and discussed management with the resident. I have reviewed and agree with the resident’s findings including all diagnostic interpretations, and treatment plans as written. Any areas of disagreement are noted on the chart. I was personally present for the key portions of any procedures. I have documented in the chart those procedures where I was not present during the key portions. I have reviewed the emergency nurses triage note. I agree with the chief complaint, past medical history, past surgical history, allergies, medications, social and family history as documented unless otherwise noted below. Documentation of the HPI, Physical Exam and Medical Decision Making performed by scribduke is based on my personal performance of the HPI, PE and MDM. For Physician Assistant/ Nurse Practitioner cases/documentation I have personally evaluated this patient and have completed at least one if not all key elements of the E/M (history, physical exam, and MDM). Additional findings are as noted.    Patient with tingling to his left leg as well as left small finger in his hand.  Ongoing over the past hour.  Patient has had neuroendovascular intervention by Dr. Foss including a flow diverter that was recently placed as well as previous aneurysm that was repaired a year ago.  Patient is on aspirin and Plavix and compliant.  Reports a very transient episode of dizziness en route to the hospital.  Is accompanied by wife at this time    Stroke was called, due to his symptoms and previous history.    EKG shows normal sinus rhythm with sinus arrhythmia ventricular rate of 75 T wave inversion noted in lead III but no ST elevations noted.    Will proceed with CT imaging speak with neuroendovascular team and plan on managing

## 2025-05-14 NOTE — PROGRESS NOTES
[facility]     Emergency/Trauma Note    PATIENT NAME: Jeremy Teran    Shift date: 05/14/2025  Shift day: Wednesday   Shift # 1    Room # 30/30     Name: Jeremy Teran            Age: 45 y.o.  Gender: male          Shinto: None   Place of Jehovah's witness:     Trauma/Incident type: Stroke Alert  Admit Date & Time: 5/14/2025 12:56 PM  TRAUMA NAME: None    ADVANCE DIRECTIVES IN CHART?  No    NAME OF DECISION MAKER:     RELATIONSHIP OF DECISION MAKER TO PATIENT:     PATIENT/EVENT DESCRIPTION:  Jeremy Teran is a 45 y.o. male who arrived ED as stroke alert. Patient to be admitted to 30/30.    Patient was awake and alert when  entered his room.     SPIRITUAL ASSESSMENT-INTERVENTION-OUTCOME:  No spiritual assessment was carried out. However, patient was receptive to spiritual care. Family was present at the time. When asked how he was feeling, patient responded, \"okay.\"  maintained listening presence, offered support, prayed with patient and family and reassured them that they were in good hands. Patient and family expressed gratitude for the spiritual and emotional support they received.     PATIENT BELONGINGS:  This  did not handle patient's belongings.     ANY BELONGINGS OF SIGNIFICANT VALUE NOTED:  Unknown    REGISTRATION STAFF NOTIFIED?  Yes    WHAT IS YOUR SPIRITUAL CARE PLAN FOR THIS PATIENT?:  Follow up visits recommended for ongoing assessment of patient's condition and for more prayers and support    Electronically signed by Chaplain Mikal, on 5/14/2025 at 1:50 PM.  [facility]  [phone]

## 2025-05-14 NOTE — CONSULTS
Stroke Neurology Consult Note  Stroke Alert @ 01:03 pm  Arrival at bedside @ 01:03 pm    Reason for Consult:  ED Stroke Alert  Requesting Physician:  ED team   Endovascular Neurosurgeon: Shivam Foss MD  Stroke Team: Alexis Solitario MD  History Obtained From:  patient  Chief Complaint:  Acute neurological deficits   Allergies:  Codeine and Flagyl [metronidazole]    History of Presenting Illness     Jeremy Teran is a 45 y.o.  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     Patient has been having occipital pressure-like headache which is localized towards the left, has been going since Friday, and while at work today he did notice tingling of his left leg and pinky of his left hand, and reports that his leg feels heavier than normal, denies any visual disturbance, has any dizziness or spinning sensation, no change in speech.    Patient has been taking aspirin and Plavix as he got a right direct flow diverter few weeks ago, did also have a flow diverter of left ICA on 11/2024 for pseudoaneurysms.  Patient was not having any deficits.     LKW: 12:30 PM  NIHSS: 0  Modified Khris Scale: 0  SBP: 150  Glucose: 98  CT head without contrast: No acute abnormalities  TNK: Low NIH, not a candidate  Endovascular: Consulted    Review of Systems   Constitutional:  Negative for fatigue, fever and unexpected weight change.   HENT:  Negative for rhinorrhea.    Eyes:  Negative for photophobia.   Respiratory:  Negative for cough and shortness of breath.    Cardiovascular:  Negative for chest pain, palpitations and leg swelling.   Gastrointestinal:  Negative for abdominal distention, abdominal pain, constipation, diarrhea and nausea.   Endocrine: Negative for polyuria.   Genitourinary:  Negative for dysuria, frequency and hematuria.   Musculoskeletal:  Positive for neck pain. Negative for arthralgias and back pain.   Skin:

## 2025-05-14 NOTE — ED PROVIDER NOTES
[facility] ED  Emergency Department Encounter  Emergency Medicine Resident     Pt Name:Jeremy Teran  MRN: [id number]  Birthdate 1980  Date of evaluation: 5/14/25  PCP:  Jacky William MD  Note Started: 1:05 PM EDT      CHIEF COMPLAINT       Paresthesias  - Recent stenting for aneurysm    HISTORY OF PRESENT ILLNESS  (Location/Symptom, Timing/Onset, Context/Setting, Quality, Duration, Modifying Factors, Severity.)      Jeremy Teran is a 45 y.o. male who presents with paresthesia to the left foot, states that he has been having intermittent paresthesias as well to his left hand.  Patient does have significant neurological history with a recent flow diverter placed 2 weeks ago by endovascular neurology for a right vertebral artery dissecting pseudoaneurysm.  Patient states that he does also have a history of previous left cervical ICA pseudoaneurysm as well.    He is on Plavix and aspirin, states that he has been compliant with this daily.  He denies anticoagulation.  He denies vision changes, denies headache, denies head trauma, denies nausea or vomiting, denies weakness, states that it specifically feels like a tingling sensation.    He does endorse chest pain however states that this has been \"worked up before\".  He denies shortness of breath.    He does have a history of migraines however is denying a headache at this time.    PAST MEDICAL / SURGICAL / SOCIAL / FAMILY HISTORY      has a past medical history of Carotid pseudoaneurysm, Dizziness, Hypertension, Migraines, Snores, and Under care of team.     has a past surgical history that includes Cholecystectomy; eye surgery; Vasectomy; Tonsillectomy; Cerebral angiogram (02/04/2025); and Cerebral angiogram (Bilateral, 04/30/2025).    Social History     Socioeconomic History    Marital status:      Spouse name: Not on file    Number of children: Not on file    Years of education: Not on file    Highest education level: Not on file  to neurology for DSA given patient's history of multiple cerebral aneurysms [KJ]      ED Course User Index  [KJ] Arvin Diego MD       IMAGING:  XR CHEST PORTABLE   Final Result   No acute process.         CT HEAD WO CONTRAST   Final Result   No acute intracranial abnormality.         CTA HEAD NECK W CONTRAST   Final Result   Cervical left internal carotid artery stent with in stent stenosis versus   incomplete expansion.      Mild stenosis in the distal V4 portion of the left vertebral artery.      Stent in the V4 portion of the right vertebral artery with in stent stenosis   versus incomplete expansion most pronounced distally.             MEDICATIONS GIVEN TO PATIENT THIS ENCOUNTER:  Orders Placed This Encounter   Medications    iopamidol (ISOVUE-370) 76 % injection 90 mL       PROCEDURES:  Procedures     CONSULTS:  IP CONSULT TO NEUROLOGY    FINAL IMPRESSION      1. Paresthesias    2. History of cerebral aneurysm repair          DISPOSITION / PLAN     DISPOSITION Admitted 05/14/2025 03:08:50 PM           PATIENT REFERRED TO:  No follow-up provider specified.    DISCHARGE MEDICATIONS:  New Prescriptions    No medications on file       Arvin Diego MD  Emergency Medicine Resident    (Please note that portions of this note were completed with a voice recognition program.  Efforts were made to edit the dictations but occasionally words are mis-transcribed.)

## 2025-05-14 NOTE — ED NOTES
ED to inpatient nurses report      Chief Complaint:  Chief Complaint   Patient presents with    Tingling     Left extremities       Present to ED from: home     MOA:     LOC: alert and orientated to name, place, date  Mobility: Independent  Oxygen Baseline: RA    Current needs required: RA   Pending ED orders:   Present condition: stable    Why did the patient come to the ED? Tingling to LLE & LUE  What is the plan?   Any procedures or intervention occur?   Any safety concerns??    Mental Status:       Psych Assessment:   Psychosocial  Psychosocial (WDL): Within Defined Limits  Vital signs   Vitals:    05/14/25 1328 05/14/25 1330 05/14/25 1338 05/14/25 1340   BP:  (!) 147/93     Pulse: 74 70 67 66   Resp:       Temp:       TempSrc:       SpO2: 100% 100% 100% 98%        Vitals:  Patient Vitals for the past 24 hrs:   BP Temp Temp src Pulse Resp SpO2   05/14/25 1340 -- -- -- 66 -- 98 %   05/14/25 1338 -- -- -- 67 -- 100 %   05/14/25 1330 (!) 147/93 -- -- 70 -- 100 %   05/14/25 1328 -- -- -- 74 -- 100 %   05/14/25 1319 -- -- -- 74 -- 100 %   05/14/25 1311 (!) 153/98 -- -- 67 14 100 %   05/14/25 1305 (!) 150/114 -- -- 76 -- 100 %   05/14/25 1304 -- 98.2 °F (36.8 °C) Oral -- 16 --   05/14/25 1303 -- -- -- 74 -- 93 %      Visit Vitals  BP (!) 147/93   Pulse 66   Temp 98.2 °F (36.8 °C) (Oral)   Resp 14   SpO2 98%        LDAs:   Peripheral IV 05/14/25 Distal;Right;Anterior Antecubital (Active)       Ambulatory Status:  Presents to emergency department  because of falls (Syncope, seizure, or loss of consciousness): No, Age > 70: No, Altered Mental Status, Intoxication with alcohol or substance confusion (Disorientation, impaired judgment, poor safety awaremess, or inability to follow instructions): No, Impaired Mobility: Ambulates or transfers with assistive devices or assistance; Unable to ambulate or transer.: No, Nursing Judgement: No    Diagnosis:  DISPOSITION Admitted 05/14/2025 03:08:50 PM   Final diagnoses:

## 2025-05-14 NOTE — ED NOTES
Pt arrived to ED with report of LLE tingling x 1 hour with intermittent LUE tingling x 20min PTA.   Pt states he also started having headaches/tension to posterior neck 5/09/25 that he says has continued intermittently.   Pt reports recent stent placed to neck.   Pt denies weakness, denies vision changes,   Pt A&Ox4, RR even/unlabored, call light within reach

## 2025-05-14 NOTE — H&P
Past Surgical History:     Past Surgical History:   Procedure Laterality Date    CEREBRAL ANGIOGRAM  02/04/2025    DR NIÑO    CEREBRAL ANGIOGRAM Bilateral 04/30/2025    CHOLECYSTECTOMY      EYE SURGERY      Lasik    TONSILLECTOMY      VASECTOMY          Medications Prior to Admission:     Prior to Admission medications    Medication Sig Start Date End Date Taking? Authorizing Provider   aspirin 81 MG EC tablet Take 1 tablet by mouth daily 5/1/25   Horacio Olivares APRN - CNP   clopidogrel (PLAVIX) 75 MG tablet Take 1 tablet by mouth daily 5/1/25   Horacio Olivares APRN - CNP   omeprazole (PRILOSEC) 20 MG delayed release capsule Take 1 capsule by mouth daily 10/23/24   Rosalind Herron MD   fluticasone (FLONASE) 50 MCG/ACT nasal spray 2 sprays by Nasal route daily 10/23/24   Rosalind Herron MD   meclizine (ANTIVERT) 25 MG tablet Take 1 tablet by mouth 3 times daily as needed  Patient not taking: Reported on 3/26/2025 10/9/24   Rosalind Herron MD   Loratadine (CLARITIN PO) Take 1 tablet by mouth daily    ProviderRosalind MD        Allergies:     Codeine and Flagyl [metronidazole]    Social History:     Tobacco:    reports that he has never smoked. He has never used smokeless tobacco.  Alcohol:      reports that he does not currently use alcohol.  Drug Use:  reports no history of drug use.    Family History:     Family History   Problem Relation Age of Onset    Atrial Fibrillation Mother        Review of Systems:     Review of Systems   Constitutional:  Negative for fatigue, fever and unexpected weight change.   HENT:  Negative for rhinorrhea.    Eyes:  Negative for photophobia.   Respiratory:  Negative for cough and shortness of breath.    Cardiovascular:  Negative for chest pain, palpitations and leg swelling.   Gastrointestinal:  Negative for abdominal distention, abdominal pain, constipation, diarrhea and nausea.   Endocrine: Negative for polyuria.   Genitourinary:  Negative for dysuria,  MD Keith  Neurology Resident PGY-4  5/14/2025  3:31 PM    Copy sent to Jacky Panda MD

## 2025-05-14 NOTE — CONSULTS
Endovascular Neurosurgery Consult      Reason for evaluation: hx of LICA, Rt V24 pseudoaneurysms s/p flow diverter, new left leg weakness     SUBJECTIVE:   History of Chief Complaint:      Jeremy Teran is a 45 y.o.  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     Patient has been taking aspirin and Plavix as he got a right direct flow diverter few weeks ago, did also have a flow diverter of left ICA on 11/2024 for pseudoaneurysms.  Patient was not having any deficits.     Stat CT head was unremarkable, CTA head and neck was negative for LVO however it is showing an end stenosis vs incomplete expansion of left ICA stent and right v4, mild stenosis distal left V4.       Allergies  is allergic to codeine and flagyl [metronidazole].  Medications  Prior to Admission medications    Medication Sig Start Date End Date Taking? Authorizing Provider   aspirin 81 MG EC tablet Take 1 tablet by mouth daily 5/1/25   Horacio Olivares APRN - CNP   clopidogrel (PLAVIX) 75 MG tablet Take 1 tablet by mouth daily 5/1/25   Horacio Olivares APRN - CNP   omeprazole (PRILOSEC) 20 MG delayed release capsule Take 1 capsule by mouth daily 10/23/24   Rosalind Herron MD   fluticasone (FLONASE) 50 MCG/ACT nasal spray 2 sprays by Nasal route daily 10/23/24   Rosalind Herron MD   meclizine (ANTIVERT) 25 MG tablet Take 1 tablet by mouth 3 times daily as needed  Patient not taking: Reported on 3/26/2025 10/9/24   Rosalind Herron MD   Loratadine

## 2025-05-15 ENCOUNTER — APPOINTMENT (OUTPATIENT)
Dept: MRI IMAGING | Age: 45
DRG: 948 | End: 2025-05-15
Payer: COMMERCIAL

## 2025-05-15 VITALS
WEIGHT: 168 LBS | HEART RATE: 87 BPM | RESPIRATION RATE: 17 BRPM | HEIGHT: 70 IN | DIASTOLIC BLOOD PRESSURE: 93 MMHG | SYSTOLIC BLOOD PRESSURE: 130 MMHG | TEMPERATURE: 98.3 F | OXYGEN SATURATION: 99 % | BODY MASS INDEX: 24.05 KG/M2

## 2025-05-15 LAB
ANION GAP SERPL CALCULATED.3IONS-SCNC: 7 MMOL/L (ref 9–16)
BASOPHILS # BLD: 0.03 K/UL (ref 0–0.2)
BASOPHILS NFR BLD: 1 % (ref 0–2)
BUN SERPL-MCNC: 6 MG/DL (ref 6–20)
CALCIUM SERPL-MCNC: 9.8 MG/DL (ref 8.6–10.4)
CHLORIDE SERPL-SCNC: 104 MMOL/L (ref 98–107)
CO2 SERPL-SCNC: 26 MMOL/L (ref 20–31)
CREAT SERPL-MCNC: 0.9 MG/DL (ref 0.7–1.2)
EOSINOPHIL # BLD: 0.17 K/UL (ref 0–0.44)
EOSINOPHILS RELATIVE PERCENT: 3 % (ref 1–4)
ERYTHROCYTE [DISTWIDTH] IN BLOOD BY AUTOMATED COUNT: 12.5 % (ref 11.8–14.4)
GFR, ESTIMATED: >90 ML/MIN/1.73M2
GLUCOSE SERPL-MCNC: 105 MG/DL (ref 74–99)
HCT VFR BLD AUTO: 47.9 % (ref 40.7–50.3)
HGB BLD-MCNC: 15.9 G/DL (ref 13–17)
IMM GRANULOCYTES # BLD AUTO: <0.03 K/UL (ref 0–0.3)
IMM GRANULOCYTES NFR BLD: 0 %
LYMPHOCYTES NFR BLD: 1.51 K/UL (ref 1.1–3.7)
LYMPHOCYTES RELATIVE PERCENT: 25 % (ref 24–43)
MCH RBC QN AUTO: 31 PG (ref 25.2–33.5)
MCHC RBC AUTO-ENTMCNC: 33.2 G/DL (ref 28.4–34.8)
MCV RBC AUTO: 93.4 FL (ref 82.6–102.9)
MONOCYTES NFR BLD: 0.51 K/UL (ref 0.1–1.2)
MONOCYTES NFR BLD: 9 % (ref 3–12)
NEUTROPHILS NFR BLD: 62 % (ref 36–65)
NEUTS SEG NFR BLD: 3.75 K/UL (ref 1.5–8.1)
NRBC BLD-RTO: 0 PER 100 WBC
PLATELET # BLD AUTO: 279 K/UL (ref 138–453)
PMV BLD AUTO: 10 FL (ref 8.1–13.5)
POTASSIUM SERPL-SCNC: 4.2 MMOL/L (ref 3.7–5.3)
RBC # BLD AUTO: 5.13 M/UL (ref 4.21–5.77)
SODIUM SERPL-SCNC: 137 MMOL/L (ref 136–145)
WBC OTHER # BLD: 6 K/UL (ref 3.5–11.3)

## 2025-05-15 PROCEDURE — 99233 SBSQ HOSP IP/OBS HIGH 50: CPT | Performed by: PSYCHIATRY & NEUROLOGY

## 2025-05-15 PROCEDURE — 2500000003 HC RX 250 WO HCPCS

## 2025-05-15 PROCEDURE — 80048 BASIC METABOLIC PNL TOTAL CA: CPT

## 2025-05-15 PROCEDURE — 36415 COLL VENOUS BLD VENIPUNCTURE: CPT

## 2025-05-15 PROCEDURE — 99239 HOSP IP/OBS DSCHRG MGMT >30: CPT | Performed by: PSYCHIATRY & NEUROLOGY

## 2025-05-15 PROCEDURE — 6370000000 HC RX 637 (ALT 250 FOR IP)

## 2025-05-15 PROCEDURE — 85025 COMPLETE CBC W/AUTO DIFF WBC: CPT

## 2025-05-15 PROCEDURE — 94761 N-INVAS EAR/PLS OXIMETRY MLT: CPT

## 2025-05-15 PROCEDURE — 99232 SBSQ HOSP IP/OBS MODERATE 35: CPT | Performed by: PSYCHIATRY & NEUROLOGY

## 2025-05-15 PROCEDURE — 72141 MRI NECK SPINE W/O DYE: CPT

## 2025-05-15 RX ADMIN — CLOPIDOGREL BISULFATE 75 MG: 75 TABLET, FILM COATED ORAL at 08:27

## 2025-05-15 RX ADMIN — SODIUM CHLORIDE, PRESERVATIVE FREE 10 ML: 5 INJECTION INTRAVENOUS at 08:27

## 2025-05-15 RX ADMIN — ASPIRIN 81 MG: 81 TABLET, COATED ORAL at 08:27

## 2025-05-15 ASSESSMENT — ENCOUNTER SYMPTOMS
CHEST TIGHTNESS: 0
BACK PAIN: 0
VOMITING: 0
TROUBLE SWALLOWING: 0
VOICE CHANGE: 0
NAUSEA: 0
COUGH: 0
SHORTNESS OF BREATH: 0
DIARRHEA: 0
ABDOMINAL DISTENTION: 0
CONSTIPATION: 0
BLOOD IN STOOL: 0
ABDOMINAL PAIN: 0
STRIDOR: 0
WHEEZING: 0

## 2025-05-15 ASSESSMENT — PAIN SCALES - GENERAL
PAINLEVEL_OUTOF10: 0

## 2025-05-15 NOTE — PROGRESS NOTES
Endovascular Neurosurgery Consult      Reason for evaluation: hx of LICA, Rt V24 pseudoaneurysms s/p flow diverter, new left leg weakness     SUBJECTIVE:   History of Chief Complaint:      Jeremy Teran is a 45 y.o.  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     Patient has been taking aspirin and Plavix as he got a right direct flow diverter few weeks ago, did also have a flow diverter of left ICA on 11/2024 for pseudoaneurysms.  Patient was not having any deficits.     Stat CT head was unremarkable, CTA head and neck was negative for LVO however it is showing an end stenosis vs incomplete expansion of left ICA stent and right v4, mild stenosis distal left V4.       Allergies  is allergic to codeine and flagyl [metronidazole].  Medications  Prior to Admission medications    Medication Sig Start Date End Date Taking? Authorizing Provider   aspirin 81 MG EC tablet Take 1 tablet by mouth daily 5/1/25  Yes Horacio Olivares APRN - CNP   clopidogrel (PLAVIX) 75 MG tablet Take 1 tablet by mouth daily 5/1/25  Yes Horacio Olivares APRN - CNP   omeprazole (PRILOSEC) 20 MG delayed release capsule Take 1 capsule by mouth daily 10/23/24  Yes Rosalind Herron MD   fluticasone (FLONASE) 50 MCG/ACT nasal spray 2 sprays by Nasal route daily 10/23/24  Yes Rosalind Herron MD   Loratadine (CLARITIN PO) Take 1 tablet by mouth daily   Yes Rosalind Herron MD    Scheduled Meds:   aspirin  81 mg Oral Daily    clopidogrel  75 mg Oral Daily

## 2025-05-15 NOTE — ED PROVIDER NOTES
ATTENDING  ADDENDUM     Care of this patient was assumed from Dr. Goldberg.  The patient was seen for Tingling (Left extremities/)  .  The patient's initial evaluation and plan have been discussed with the prior provider who initially evaluated the patient.  Nursing Notes, Past Medical Hx, Past Surgical Hx, Social Hx, Allergies, and Family Hx were all reviewed.      ED COURSE      The patient was given the following medications:  Orders Placed This Encounter   Medications    iopamidol (ISOVUE-370) 76 % injection 90 mL    aspirin EC tablet 81 mg    clopidogrel (PLAVIX) tablet 75 mg    sodium chloride flush 0.9 % injection 5-40 mL    sodium chloride flush 0.9 % injection 5-40 mL    0.9 % sodium chloride infusion    OR Linked Order Group     potassium chloride (KLOR-CON M) extended release tablet 40 mEq     potassium bicarb-citric acid (EFFER-K) effervescent tablet 40 mEq     potassium chloride 10 mEq/100 mL IVPB (Peripheral Line)    magnesium sulfate 2000 mg in 50 mL IVPB premix    enoxaparin (LOVENOX) injection 40 mg     Indication of Use:   Prophylaxis-DVT/PE    OR Linked Order Group     ondansetron (ZOFRAN-ODT) disintegrating tablet 4 mg     ondansetron (ZOFRAN) injection 4 mg    polyethylene glycol (GLYCOLAX) packet 17 g    OR Linked Order Group     acetaminophen (TYLENOL) tablet 650 mg     acetaminophen (TYLENOL) suppository 650 mg       RECENT VITALS:   Temp: 97.7 °F (36.5 °C), Pulse: 68, Respirations: 16, BP: (!) 130/101    MEDICAL DECISION MAKING       Patient presented with left sided tingling. Known intracranial aneurysm. Neurology involved. Plan for admission.      Katie Brothers MD  Emergency Medicine Attending       Katie Brothers MD  05/15/25 0849

## 2025-05-15 NOTE — DISCHARGE SUMMARY
Pt discharged home ,left unit via wheelchair accompanied with writer.   Discharge teaching provided, answer all questions.   Pt left with all belongings in possessions.  Care plan resolved.   Fair well wishes expressed to patient upon departure.

## 2025-05-15 NOTE — CARE COORDINATION
05/15/25 1150   Readmission Assessment   Number of Days since last admission? 8-30 days   Previous Disposition Home with Family   Who is being Interviewed Patient   What was the patient's/caregiver's perception as to why they think they needed to return back to the hospital? Other (Comment)  (New onset of symptoms)   Did you visit your Primary Care Physician after you left the hospital, before you returned this time? No   Why weren't you able to visit your PCP? Did not have an appointment   Did you see a specialist, such as Cardiac, Pulmonary, Orthopedic Physician, etc. after you left the hospital? No   Who advised the patient to return to the hospital? Self-referral   Does the patient report anything that got in the way of taking their medications? No   In our efforts to provide the best possible care to you and others like you, can you think of anything that we could have done to help you after you left the hospital the first time, so that you might not have needed to return so soon? Other (Comment)  (No)

## 2025-05-15 NOTE — CARE COORDINATION
Case Management Assessment  Initial Evaluation    Date/Time of Evaluation: 5/15/2025 11:46 AM  Assessment Completed by: Seven Whitten    If patient is discharged prior to next notation, then this note serves as note for discharge by case management.    Patient Name: Jeremy Teran                   YOB: 1980  Diagnosis: Paresthesias [R20.2]  History of cerebral aneurysm repair [Z98.890, Z86.79]  Left leg weakness [R29.898]                   Date / Time: 5/14/2025 12:56 PM    Patient Admission Status: Inpatient   Readmission Risk (Low < 19, Mod (19-27), High > 27): Readmission Risk Score: 9.3    Current PCP: Jacky William MD  PCP verified by CM? (P) Yes    Chart Reviewed: Yes      History Provided by: (P) Patient  Patient Orientation: (P) Alert and Oriented    Patient Cognition: (P) Alert    Hospitalization in the last 30 days (Readmission):  Yes    If yes, Readmission Assessment in CM Navigator will be completed.    Advance Directives:      Code Status: Full Code   Patient's Primary Decision Maker is: (P) Legal Next of Kin      Discharge Planning:    Patient lives with: (P) Spouse/Significant Other Type of Home: (P) House  Primary Care Giver: (P) Self  Patient Support Systems include: (P) Spouse/Significant Other   Current Financial resources: (P) Other (Comment)  Current community resources: (P) None  Current services prior to admission: (P) None            Current DME:              Type of Home Care services:  (P) None    ADLS  Prior functional level: (P) Independent in ADLs/IADLs  Current functional level: (P) Independent in ADLs/IADLs    PT AM-PAC:   /24  OT AM-PAC:   /24    Family can provide assistance at DC: (P) Yes  Would you like Case Management to discuss the discharge plan with any other family members/significant others, and if so, who? (P) Yes  Plans to Return to Present Housing: (P) Yes  Other Identified Issues/Barriers to RETURNING to current housing: None  Potential Assistance  needed at discharge: (P) N/A            Potential DME:    Patient expects to discharge to: (P) House  Plan for transportation at discharge: (P) Family    Financial    [place] / Product Type: *No Product type* /     Does insurance require precert for SNF: Yes    Potential assistance Purchasing Medications: (P) No  Meds-to-Beds request: Yes      [facility] [place] [address] - P [phone] - F [phone]  [address] [place] [address]  Phone: [phone] Fax: [phone]      Notes:    Factors facilitating achievement of predicted outcomes: Motivated, Cooperative, and Pleasant    Barriers to discharge: Medical complications and Medication managment    Additional Case Management Notes: Pt. States he lives with his wife. He is independent with ADLs at home. He denies any use of DME devices at home. Pt. Notes having strong family support if needed. Plan is to return home. Currently he denies any needs/services.     The Plan for Transition of Care is related to the following treatment goals of Paresthesias [R20.2]  History of cerebral aneurysm repair [Z98.890, Z86.79]  Left leg weakness [R29.898]    IF APPLICABLE: The Patient and/or patient representative Jeremy and his family were provided with a choice of provider and agrees with the discharge plan. Freedom of choice list with basic dialogue that supports the patient's individualized plan of care/goals and shares the quality data associated with the providers was provided to: (P) Patient   Patient Representative Name:       The Patient and/or Patient Representative Agree with the Discharge Plan? (P) Yes    Seven Whitten  Case Management Department  Ph: [phone] Fax: [phone]

## 2025-05-15 NOTE — PLAN OF CARE
Problem: Discharge Planning  Goal: Discharge to home or other facility with appropriate resources  5/15/2025 0613 by Denise Almonte RN  Outcome: Progressing  5/14/2025 2025 by Denise Almonte RN  Outcome: Progressing     Problem: Safety - Adult  Goal: Free from fall injury  5/15/2025 0613 by Denise Almonte RN  Outcome: Progressing  5/14/2025 2025 by Denise Almonte RN  Outcome: Progressing     Problem: ABCDS Injury Assessment  Goal: Absence of physical injury  Outcome: Progressing

## 2025-05-15 NOTE — PROGRESS NOTES
degrees   CBC    Collection Time: 05/14/25  1:21 PM   Result Value Ref Range    WBC 6.0 3.5 - 11.3 k/uL    RBC 5.03 4.21 - 5.77 m/uL    Hemoglobin 15.8 13.0 - 17.0 g/dL    Hematocrit 45.8 40.7 - 50.3 %    MCV 91.1 82.6 - 102.9 fL    MCH 31.4 25.2 - 33.5 pg    MCHC 34.5 28.4 - 34.8 g/dL    RDW 12.3 11.8 - 14.4 %    Platelets 296 138 - 453 k/uL    MPV 9.6 8.1 - 13.5 fL    NRBC Automated 0.0 0.0 per 100 WBC   Basic Metabolic Panel    Collection Time: 05/14/25  1:21 PM   Result Value Ref Range    Sodium 136 136 - 145 mmol/L    Potassium 4.1 3.7 - 5.3 mmol/L    Chloride 99 98 - 107 mmol/L    CO2 26 20 - 31 mmol/L    Anion Gap 11 9 - 16 mmol/L    Glucose 96 74 - 99 mg/dL    BUN 6 6 - 20 mg/dL    Creatinine 0.9 0.7 - 1.2 mg/dL    Est, Glom Filt Rate >90 >60 mL/min/1.73m2    Calcium 10.0 8.6 - 10.4 mg/dL   Ethanol    Collection Time: 05/14/25  1:21 PM   Result Value Ref Range    Ethanol Lvl <10 <10 mg/dL    Ethanol percent <0.010 <0.010 %   Troponin    Collection Time: 05/14/25  1:21 PM   Result Value Ref Range    Troponin, High Sensitivity <6 0 - 22 ng/L   CBC with Auto Differential    Collection Time: 05/15/25  8:51 AM   Result Value Ref Range    WBC 6.0 3.5 - 11.3 k/uL    RBC 5.13 4.21 - 5.77 m/uL    Hemoglobin 15.9 13.0 - 17.0 g/dL    Hematocrit 47.9 40.7 - 50.3 %    MCV 93.4 82.6 - 102.9 fL    MCH 31.0 25.2 - 33.5 pg    MCHC 33.2 28.4 - 34.8 g/dL    RDW 12.5 11.8 - 14.4 %    Platelets 279 138 - 453 k/uL    MPV 10.0 8.1 - 13.5 fL    NRBC Automated 0.0 0.0 per 100 WBC    Neutrophils % 62 36 - 65 %    Lymphocytes % 25 24 - 43 %    Monocytes % 9 3 - 12 %    Eosinophils % 3 1 - 4 %    Basophils % 1 0 - 2 %    Immature Granulocytes % 0 0 %    Neutrophils Absolute 3.75 1.50 - 8.10 k/uL    Lymphocytes Absolute 1.51 1.10 - 3.70 k/uL    Monocytes Absolute 0.51 0.10 - 1.20 k/uL    Eosinophils Absolute 0.17 0.00 - 0.44 k/uL    Basophils Absolute 0.03 0.00 - 0.20 k/uL    Immature Granulocytes Absolute <0.03 0.00 - 0.30 k/uL  including those of syntax and sound a- like substitutions which may escape proofreading. In such instances, actual meaning can be extrapolated by contextual derivation.

## 2025-05-16 NOTE — DISCHARGE SUMMARY
[facility]     Department of Neurology    INPATIENT DISCHARGE SUMMARY        Patient Identification:  Jeremy Teran is a 45 y.o. male.  :  1980  MRN: [id number]     Acct: [id number]   Admit Date:  2025  Discharge date: 5/15/2025    Attending Provider: Dr. Cristofer Ruiz MD    Admission Diagnoses:   Paresthesias [R20.2]  History of cerebral aneurysm repair [Z98.890, Z86.79]  Left leg weakness [R29.898]    Discharge Diagnoses:   Principal Problem:    Left leg weakness  Active Problems:    Paresthesias    History of cerebral aneurysm repair    Presence of internal carotid stent  Resolved Problems:    * No resolved hospital problems. 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 He presented with new-onset left leg tingling beginning the morning of admission, without clear numbness or associated lower back pain. He also reported occipital, pressure-like headaches predominantly on the left side, ongoing since the prior Friday. He has remained compliant with dual antiplatelet therapy (aspirin and Plavix). Neurologic exam was nonfocal with questionable mild left leg weakness (4+/5), though this was not consistently reproducible. CTA head and neck showed no large vessel occlusion but did reveal end stenosis or incomplete expansion of the left ICA stent, in-stent stenosis of the right V4 segment, and mild distal left V4 stenosis. Brain MRI without contrast showed no evidence of acute infarct. Given the sensory complaints and uncertain weakness, MRI of the cervical spine was obtained to evaluate for possible spinal pathology which just showed some mild degenerative changes; however, no objective deficits were noted on exam. He was

## 2025-05-17 LAB
EKG ATRIAL RATE: 75 BPM
EKG P AXIS: 53 DEGREES
EKG P-R INTERVAL: 154 MS
EKG Q-T INTERVAL: 420 MS
EKG QRS DURATION: 92 MS
EKG QTC CALCULATION (BAZETT): 469 MS
EKG R AXIS: 48 DEGREES
EKG T AXIS: 21 DEGREES
EKG VENTRICULAR RATE: 75 BPM

## 2025-05-22 NOTE — PROGRESS NOTES
Physician Progress Note      PATIENT:               THUAN JACOME  CSN #:                  [id number]  :                       1980  ADMIT DATE:       2025 12:56 PM  DISCH DATE:        5/15/2025 1:00 AM  RESPONDING  PROVIDER #:        LINETTE CURRIE          QUERY TEXT:    Please clarify in documentation the relationship, if any, between left leg   weakness/Left-sided paresthesias and end stenosis or incomplete expansion of   the left ICA stent, in-stent stenosis of the right V4 segment and stenosis   distal left V4. 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 Mild stenosis in the distal V4 portion of the   left vertebral artery. Stent in the V4 portion of the right vertebral artery   with in stent stenosis versus incomplete expansion most pronounced distally.    aspirin and Plavix  Options provided:  -- Related to or associated with each other  -- Unrelated to each other  -- Other - I will add my own diagnosis  -- Disagree - Not applicable / Not valid  -- Disagree - Clinically unable to determine / Unknown  -- Refer to Clinical Documentation Reviewer    PROVIDER RESPONSE TEXT:    The conditions noted are unrelated to each other.    Query created by: Shira Marrero on 2025 7:24 AM      Electronically signed by:  LINETTE CRURIE 2025 9:29 PM

## 2025-06-18 RX ORDER — CLOPIDOGREL BISULFATE 75 MG/1
75 TABLET ORAL DAILY
Qty: 30 TABLET | Refills: 1 | Status: SHIPPED | OUTPATIENT
Start: 2025-06-18